# Patient Record
Sex: FEMALE | Race: BLACK OR AFRICAN AMERICAN | NOT HISPANIC OR LATINO | Employment: FULL TIME | URBAN - METROPOLITAN AREA
[De-identification: names, ages, dates, MRNs, and addresses within clinical notes are randomized per-mention and may not be internally consistent; named-entity substitution may affect disease eponyms.]

---

## 2017-08-28 ENCOUNTER — PATIENT MESSAGE (OUTPATIENT)
Dept: OBSTETRICS AND GYNECOLOGY | Facility: CLINIC | Age: 37
End: 2017-08-28

## 2017-12-18 ENCOUNTER — OFFICE VISIT (OUTPATIENT)
Dept: OBSTETRICS AND GYNECOLOGY | Facility: CLINIC | Age: 37
End: 2017-12-18
Payer: COMMERCIAL

## 2017-12-18 VITALS
BODY MASS INDEX: 51.91 KG/M2 | HEIGHT: 63 IN | DIASTOLIC BLOOD PRESSURE: 83 MMHG | WEIGHT: 293 LBS | SYSTOLIC BLOOD PRESSURE: 126 MMHG

## 2017-12-18 DIAGNOSIS — Z12.4 SCREENING FOR CERVICAL CANCER: ICD-10-CM

## 2017-12-18 DIAGNOSIS — Z01.419 ENCOUNTER FOR GYNECOLOGICAL EXAMINATION (GENERAL) (ROUTINE) WITHOUT ABNORMAL FINDINGS: Primary | ICD-10-CM

## 2017-12-18 PROCEDURE — 88175 CYTOPATH C/V AUTO FLUID REDO: CPT

## 2017-12-18 PROCEDURE — 99999 PR PBB SHADOW E&M-EST. PATIENT-LVL III: CPT | Mod: PBBFAC,,, | Performed by: OBSTETRICS & GYNECOLOGY

## 2017-12-18 PROCEDURE — 99395 PREV VISIT EST AGE 18-39: CPT | Mod: S$GLB,,, | Performed by: OBSTETRICS & GYNECOLOGY

## 2017-12-18 NOTE — PROGRESS NOTES
Subjective:       Patient ID: Favian Hernandes is a 37 y.o. female.    Chief Complaint:  Annual Exam      History of Present Illness  HPI  Annual Exam-Premenopausal  Patient presents for annual exam. The patient has no complaints today. The patient is sexually active--trying to conceive. GYN screening history: last pap: approximate date  and was normal. The patient wears seatbelts: yes. The patient participates in regular exercise: yes--walking 3 times/wk; and at work; . Has the patient ever been transfused or tattooed?: no. The patient reports that there is not domestic violence in her life.    Menses monthly q 24 days, flow 3-5 d; super tampon on first 2 days; change q3-4 hrs; min to mod dysmenorrhea--relief with aspirin/ibuprofen    Reports occas urge /overflow incontinence        GYN & OB History  Patient's last menstrual period was 2017.   Date of Last Pap: No result found    OB History    Para Term  AB Living   2 1   1 1 0   SAB TAB Ectopic Multiple Live Births   1       0      # Outcome Date GA Lbr Gus/2nd Weight Sex Delivery Anes PTL Lv   2 SAB            1       Vag-Spont             Review of Systems  Review of Systems   Constitutional: Negative for activity change, appetite change, chills, diaphoresis, fatigue, fever and unexpected weight change.   HENT: Negative for mouth sores and tinnitus.    Eyes: Negative for discharge and visual disturbance.   Respiratory: Negative for cough, shortness of breath and wheezing.    Cardiovascular: Negative for chest pain, palpitations and leg swelling.   Gastrointestinal: Negative for abdominal pain, bloating, blood in stool, constipation, diarrhea, nausea and vomiting.   Endocrine: Negative for diabetes, hair loss, hot flashes, hyperthyroidism and hypothyroidism.   Genitourinary: Negative for decreased libido, dyspareunia, dysuria, flank pain, frequency, genital sores, hematuria, menorrhagia, menstrual problem, pelvic pain,  urgency, vaginal bleeding, vaginal discharge, vaginal pain, dysmenorrhea, urinary incontinence, postcoital bleeding, postmenopausal bleeding and vaginal odor.   Musculoskeletal: Negative for back pain and myalgias.   Skin:  Negative for rash, no acne and hair changes.   Neurological: Negative for seizures, syncope, numbness and headaches.   Hematological: Negative for adenopathy. Does not bruise/bleed easily.   Psychiatric/Behavioral: Negative for depression and sleep disturbance. The patient is not nervous/anxious.    Breast: Negative for breast mass, breast pain, nipple discharge and skin changes          Objective:    Physical Exam:   Constitutional: She appears well-developed.     Eyes: Conjunctivae and EOM are normal. Pupils are equal, round, and reactive to light.    Neck: Normal range of motion. Neck supple.     Pulmonary/Chest: Effort normal. Right breast exhibits no mass, no nipple discharge, no skin change and no tenderness. Left breast exhibits no mass, no nipple discharge, no skin change and no tenderness. Breasts are symmetrical.        Abdominal: Soft.     Genitourinary: Rectum normal, vagina normal and uterus normal. Pelvic exam was performed with patient supine. Cervix is normal. Right adnexum displays no mass and no tenderness. Left adnexum displays no mass and no tenderness. No erythema, bleeding, rectocele, cystocele or unspecified prolapse of vaginal walls in the vagina. No vaginal discharge found. Labial bartholins normal.       Uterus Size: 6 cm   Musculoskeletal: Normal range of motion.       Neurological: She is alert.    Skin: Skin is warm.    Psychiatric: She has a normal mood and affect.          Assessment:     Encounter Diagnoses   Name Primary?    Encounter for gynecological examination (general) (routine) without abnormal findings Yes    Screening for cervical cancer                Plan:      Continue annual well woman exam.  Pap today  Continue prenatal vitamin and menstrual  calendar  Continue diet, exercise, weight loss

## 2017-12-27 ENCOUNTER — PATIENT MESSAGE (OUTPATIENT)
Dept: OBSTETRICS AND GYNECOLOGY | Facility: CLINIC | Age: 37
End: 2017-12-27

## 2018-08-08 ENCOUNTER — LAB VISIT (OUTPATIENT)
Dept: LAB | Facility: HOSPITAL | Age: 38
End: 2018-08-08
Attending: ADVANCED PRACTICE MIDWIFE
Payer: COMMERCIAL

## 2018-08-08 ENCOUNTER — INITIAL PRENATAL (OUTPATIENT)
Dept: OBSTETRICS AND GYNECOLOGY | Facility: CLINIC | Age: 38
End: 2018-08-08
Payer: COMMERCIAL

## 2018-08-08 VITALS — DIASTOLIC BLOOD PRESSURE: 86 MMHG | SYSTOLIC BLOOD PRESSURE: 120 MMHG | BODY MASS INDEX: 59.36 KG/M2 | WEIGHT: 293 LBS

## 2018-08-08 DIAGNOSIS — O09.91 HIGH-RISK PREGNANCY IN FIRST TRIMESTER: ICD-10-CM

## 2018-08-08 DIAGNOSIS — O09.299 H/O INCOMPETENT CERVIX, CURRENTLY PREGNANT: ICD-10-CM

## 2018-08-08 DIAGNOSIS — O09.91 HIGH-RISK PREGNANCY IN FIRST TRIMESTER: Primary | ICD-10-CM

## 2018-08-08 LAB
ABO + RH BLD: NORMAL
ALBUMIN SERPL BCP-MCNC: 3.6 G/DL
ALP SERPL-CCNC: 53 U/L
ALT SERPL W/O P-5'-P-CCNC: 31 U/L
ANION GAP SERPL CALC-SCNC: 10 MMOL/L
AST SERPL-CCNC: 30 U/L
BASOPHILS # BLD AUTO: 0.05 K/UL
BASOPHILS NFR BLD: 1 %
BILIRUB SERPL-MCNC: 0.3 MG/DL
BLD GP AB SCN CELLS X3 SERPL QL: NORMAL
BUN SERPL-MCNC: 6 MG/DL
CALCIUM SERPL-MCNC: 9.7 MG/DL
CHLORIDE SERPL-SCNC: 103 MMOL/L
CO2 SERPL-SCNC: 21 MMOL/L
CREAT SERPL-MCNC: 0.8 MG/DL
DIFFERENTIAL METHOD: ABNORMAL
EOSINOPHIL # BLD AUTO: 0 K/UL
EOSINOPHIL NFR BLD: 0.8 %
ERYTHROCYTE [DISTWIDTH] IN BLOOD BY AUTOMATED COUNT: 14.1 %
EST. GFR  (AFRICAN AMERICAN): >60 ML/MIN/1.73 M^2
EST. GFR  (NON AFRICAN AMERICAN): >60 ML/MIN/1.73 M^2
GLUCOSE SERPL-MCNC: 88 MG/DL
HCT VFR BLD AUTO: 35.8 %
HGB BLD-MCNC: 11.8 G/DL
IMM GRANULOCYTES # BLD AUTO: 0.01 K/UL
IMM GRANULOCYTES NFR BLD AUTO: 0.2 %
LYMPHOCYTES # BLD AUTO: 1.7 K/UL
LYMPHOCYTES NFR BLD: 35.4 %
MCH RBC QN AUTO: 28.3 PG
MCHC RBC AUTO-ENTMCNC: 33 G/DL
MCV RBC AUTO: 86 FL
MONOCYTES # BLD AUTO: 0.6 K/UL
MONOCYTES NFR BLD: 12.2 %
NEUTROPHILS # BLD AUTO: 2.4 K/UL
NEUTROPHILS NFR BLD: 50.4 %
NRBC BLD-RTO: 0 /100 WBC
PLATELET # BLD AUTO: 500 K/UL
PMV BLD AUTO: 9.6 FL
POTASSIUM SERPL-SCNC: 3.7 MMOL/L
PROT SERPL-MCNC: 7.8 G/DL
RBC # BLD AUTO: 4.17 M/UL
SODIUM SERPL-SCNC: 134 MMOL/L
WBC # BLD AUTO: 4.77 K/UL

## 2018-08-08 PROCEDURE — 99999 PR PBB SHADOW E&M-EST. PATIENT-LVL II: CPT | Mod: PBBFAC,,, | Performed by: ADVANCED PRACTICE MIDWIFE

## 2018-08-08 PROCEDURE — 36415 COLL VENOUS BLD VENIPUNCTURE: CPT | Mod: PO

## 2018-08-08 PROCEDURE — 85025 COMPLETE CBC W/AUTO DIFF WBC: CPT

## 2018-08-08 PROCEDURE — 80053 COMPREHEN METABOLIC PANEL: CPT

## 2018-08-08 PROCEDURE — 86703 HIV-1/HIV-2 1 RESULT ANTBDY: CPT

## 2018-08-08 PROCEDURE — 87340 HEPATITIS B SURFACE AG IA: CPT

## 2018-08-08 PROCEDURE — 86901 BLOOD TYPING SEROLOGIC RH(D): CPT

## 2018-08-08 PROCEDURE — 0500F INITIAL PRENATAL CARE VISIT: CPT | Mod: S$GLB,,, | Performed by: ADVANCED PRACTICE MIDWIFE

## 2018-08-08 PROCEDURE — 86592 SYPHILIS TEST NON-TREP QUAL: CPT

## 2018-08-08 PROCEDURE — 86762 RUBELLA ANTIBODY: CPT

## 2018-08-08 NOTE — PROGRESS NOTES
Coffective counseling sheet Learn Your Baby discussed with mother. Instructed regarding feeding cues and methods to calm baby. Encouraged mother to download Coffective mobile arina if she has not already done so.  Mother verbalized understanding.  NOB labs today US 1 week.  POC discussed with patient and Dr Sparks.  Pelvic rest advised.  VM

## 2018-08-09 LAB
HBV SURFACE AG SERPL QL IA: NEGATIVE
HIV 1+2 AB+HIV1 P24 AG SERPL QL IA: NEGATIVE
RPR SER QL: NORMAL
RUBV IGG SER-ACNC: 127 IU/ML
RUBV IGG SER-IMP: REACTIVE

## 2018-08-13 ENCOUNTER — TELEPHONE (OUTPATIENT)
Dept: OBSTETRICS AND GYNECOLOGY | Facility: CLINIC | Age: 38
End: 2018-08-13

## 2018-08-13 ENCOUNTER — ROUTINE PRENATAL (OUTPATIENT)
Dept: OBSTETRICS AND GYNECOLOGY | Facility: CLINIC | Age: 38
End: 2018-08-13
Payer: COMMERCIAL

## 2018-08-13 ENCOUNTER — PROCEDURE VISIT (OUTPATIENT)
Dept: OBSTETRICS AND GYNECOLOGY | Facility: CLINIC | Age: 38
End: 2018-08-13
Payer: COMMERCIAL

## 2018-08-13 VITALS — SYSTOLIC BLOOD PRESSURE: 118 MMHG | DIASTOLIC BLOOD PRESSURE: 72 MMHG | WEIGHT: 293 LBS | BODY MASS INDEX: 59.34 KG/M2

## 2018-08-13 DIAGNOSIS — O09.91 HIGH-RISK PREGNANCY IN FIRST TRIMESTER: ICD-10-CM

## 2018-08-13 DIAGNOSIS — N88.3 INCOMPETENCE OF CERVIX: Primary | ICD-10-CM

## 2018-08-13 DIAGNOSIS — D75.839 THROMBOCYTOSIS: Primary | ICD-10-CM

## 2018-08-13 PROCEDURE — 99999 PR PBB SHADOW E&M-EST. PATIENT-LVL II: CPT | Mod: PBBFAC,,, | Performed by: ADVANCED PRACTICE MIDWIFE

## 2018-08-13 PROCEDURE — 76801 OB US < 14 WKS SINGLE FETUS: CPT | Mod: S$GLB,,, | Performed by: OBSTETRICS & GYNECOLOGY

## 2018-08-13 PROCEDURE — 0502F SUBSEQUENT PRENATAL CARE: CPT | Mod: S$GLB,,, | Performed by: ADVANCED PRACTICE MIDWIFE

## 2018-08-13 NOTE — PROGRESS NOTES
US reviewed with patient .  Embryo nl. With nl. FHT's.  Increased Plt. Count.  Will repeat with RTC 4 weeks. Pt info given to Dr Sparks to schedule cerclage at 14 weeks.  Cervical length at 13 weeks prior to cerclage. VM

## 2018-09-10 ENCOUNTER — ROUTINE PRENATAL (OUTPATIENT)
Dept: OBSTETRICS AND GYNECOLOGY | Facility: CLINIC | Age: 38
End: 2018-09-10
Payer: COMMERCIAL

## 2018-09-10 VITALS — SYSTOLIC BLOOD PRESSURE: 120 MMHG | BODY MASS INDEX: 57.64 KG/M2 | DIASTOLIC BLOOD PRESSURE: 76 MMHG | WEIGHT: 293 LBS

## 2018-09-10 DIAGNOSIS — O09.299 H/O INCOMPETENT CERVIX, CURRENTLY PREGNANT: ICD-10-CM

## 2018-09-10 DIAGNOSIS — O09.91 HIGH-RISK PREGNANCY IN FIRST TRIMESTER: Primary | ICD-10-CM

## 2018-09-10 PROCEDURE — 99999 PR PBB SHADOW E&M-EST. PATIENT-LVL II: CPT | Mod: PBBFAC,,, | Performed by: ADVANCED PRACTICE MIDWIFE

## 2018-09-10 PROCEDURE — 0502F SUBSEQUENT PRENATAL CARE: CPT | Mod: S$GLB,,, | Performed by: ADVANCED PRACTICE MIDWIFE

## 2018-09-10 RX ORDER — HYDROXYPROGESTERONE CAPROATE 250 MG/ML
250 INJECTION INTRAMUSCULAR
Qty: 1000 MG | Refills: 11 | Status: SHIPPED | OUTPATIENT
Start: 2018-09-10 | End: 2018-10-31 | Stop reason: ALTCHOICE

## 2018-09-10 NOTE — PROGRESS NOTES
Weight loss noted.  Patient states has not had appetite but states has come back over the weekend.  Will monitor.  Has pre-op appt scheduled for 9/18 and cerclage scheduled for 9/26.  Pt denies any complaints.  RTC 4 weeks or sooner if indicated.  US sched for 9/18 visit.

## 2018-09-11 ENCOUNTER — TELEPHONE (OUTPATIENT)
Dept: PHARMACY | Facility: CLINIC | Age: 38
End: 2018-09-11

## 2018-09-18 ENCOUNTER — ROUTINE PRENATAL (OUTPATIENT)
Dept: OBSTETRICS AND GYNECOLOGY | Facility: CLINIC | Age: 38
End: 2018-09-18
Payer: COMMERCIAL

## 2018-09-18 ENCOUNTER — PROCEDURE VISIT (OUTPATIENT)
Dept: OBSTETRICS AND GYNECOLOGY | Facility: CLINIC | Age: 38
End: 2018-09-18
Payer: COMMERCIAL

## 2018-09-18 ENCOUNTER — HOSPITAL ENCOUNTER (OUTPATIENT)
Dept: PREADMISSION TESTING | Facility: HOSPITAL | Age: 38
Discharge: HOME | End: 2018-09-18
Attending: OBSTETRICS & GYNECOLOGY
Payer: COMMERCIAL

## 2018-09-18 VITALS — HEIGHT: 63 IN | WEIGHT: 293 LBS | BODY MASS INDEX: 51.91 KG/M2

## 2018-09-18 VITALS — DIASTOLIC BLOOD PRESSURE: 84 MMHG | WEIGHT: 293 LBS | BODY MASS INDEX: 57.8 KG/M2 | SYSTOLIC BLOOD PRESSURE: 114 MMHG

## 2018-09-18 DIAGNOSIS — O09.291 SUPERVISION OF PREGNANCY WITH OTHER POOR REPRODUCTIVE OR OBSTETRIC HISTORY, FIRST TRIMESTER: Primary | ICD-10-CM

## 2018-09-18 DIAGNOSIS — N76.0 BACTERIAL VAGINITIS: ICD-10-CM

## 2018-09-18 DIAGNOSIS — B96.89 BACTERIAL VAGINITIS: ICD-10-CM

## 2018-09-18 DIAGNOSIS — O09.299 H/O INCOMPETENT CERVIX, CURRENTLY PREGNANT: Primary | ICD-10-CM

## 2018-09-18 PROCEDURE — 99999 PR PBB SHADOW E&M-EST. PATIENT-LVL II: CPT | Mod: PBBFAC,,, | Performed by: OBSTETRICS & GYNECOLOGY

## 2018-09-18 PROCEDURE — 0502F SUBSEQUENT PRENATAL CARE: CPT | Mod: S$GLB,,, | Performed by: OBSTETRICS & GYNECOLOGY

## 2018-09-18 PROCEDURE — 76817 TRANSVAGINAL US OBSTETRIC: CPT | Mod: S$GLB,,, | Performed by: OBSTETRICS & GYNECOLOGY

## 2018-09-18 RX ORDER — CLINDAMYCIN HYDROCHLORIDE 300 MG/1
300 CAPSULE ORAL 2 TIMES DAILY
Qty: 14 CAPSULE | Refills: 0 | Status: ON HOLD | OUTPATIENT
Start: 2018-09-18 | End: 2018-09-26 | Stop reason: ALTCHOICE

## 2018-09-18 NOTE — DISCHARGE INSTRUCTIONS
To confirm, Your doctor has instructed you that surgery is scheduled for 9/26/18  at  07:00 am.       Please report to [facility], [address], 1st floor, main lobby by 05:30 am.    Pre admit office will call afternoon prior to surgery with final arrival time    INSTRUCTIONS IMPORTANT!!!   Do not eat, drink, or smoke after 12 midnight-including water. OK to brush teeth, no gum, candy or mints!    ¨ Take only these medicines with a small swallow of water-morning of surgery.  none    Pre operative instructions:  Please review the Pre-Operative Instruction booklet that you were given.        Bathing Instructions--See page 6 in the Pre-operative booklet.      Prevention of surgical site infections:     -Keep incisions clean and dry.   -Do not soak/submerge incisions in water until completely healed.   -Do not apply lotions, powders, creams, or deodorants to site.   -Always make sure hands are cleaned with antibacterial soap/ alcohol-based                 prior to touching the surgical site.  (This includes doctors,                 nurses, staff, and yourself.)    Signs and symptoms:   -Redness and pain around the area where you had surgery   -Drainage of cloudy fluid from your surgical wound   -Fever over 100.4       I have read or had read and explained to me, and understand the above information.  Additional comments or instructions:  Received a copy of Pre-operative instructions booklet, FAQ surgical site infection sheet, and packets of hibiclens (if indicated).

## 2018-09-18 NOTE — PROGRESS NOTES
History & Physical    SUBJECTIVE:     History of Present Illness:  Patient is a 38 y.o. female presents with poor obstetrical history;.  Currently pregnant; hx of 25 wk loss with  contractions and rupture and bleeding;  Currently 12w5d, cervical [id number] with debris in cervical canal;  Currently denies vaginal bleeding or abnormal discharge.      Chief Complaint   Patient presents with    preop        Review of patient's allergies indicates:  No Known Allergies    Current Outpatient Medications   Medication Sig Dispense Refill    prenatal 25/iron fum/folic/dha (PRENATAL-1 ORAL) Take 2 tablets by mouth once daily. 2 gummies/day      clindamycin (CLEOCIN) 300 MG capsule Take 1 capsule (300 mg total) by mouth 2 (two) times daily. for 7 days 14 capsule 0    HYDROXYprogesterone caproate (SALLY) injection Inject 250 mg into the muscle every 7 days. 1000 mg 11     No current facility-administered medications for this visit.        History reviewed. No pertinent past medical history.  History reviewed. No pertinent surgical history.  Family History   Problem Relation Age of Onset    Diabetes Father     Hypertension Mother      Social History     Tobacco Use    Smoking status: Never Smoker    Smokeless tobacco: Never Used   Substance Use Topics    Alcohol use: No     Comment: socally     Drug use: No        Review of Systems:  Review of Systems   Constitutional: Negative.    HENT: Negative.    Eyes: Negative.    Cardiovascular: Negative.    Gastrointestinal: Negative.    Endocrine: Negative.    Genitourinary: Negative.    Musculoskeletal: Negative.    Skin: Negative.    Allergic/Immunologic: Negative.    Neurological: Negative.    Hematological: Negative.    Psychiatric/Behavioral: Negative.  Negative for agitation.   All other systems reviewed and are negative.      OBJECTIVE:     Vital Signs (Most Recent)  BP: 114/84 (18 1327)     (!) 148 kg (326 lb 4.5 oz)     Physical Exam:  Physical Exam    Constitutional: She is oriented to person, place, and time. She appears well-developed.   HENT:   Head: Normocephalic.   Eyes: Conjunctivae and EOM are normal. Pupils are equal, round, and reactive to light.   Neck: Normal range of motion.   Cardiovascular: Normal rate.   Pulmonary/Chest: Effort normal and breath sounds normal.   Abdominal: Soft.   Genitourinary:   Genitourinary Comments: deferred   Musculoskeletal: Normal range of motion.   Neurological: She is alert and oriented to person, place, and time.   Skin: Skin is warm and dry.   Psychiatric: She has a normal mood and affect. Her behavior is normal. Judgment and thought content normal.   Vitals reviewed.      Laboratory  cbc, cmp to be drawn    Diagnostic Results:  US: Reviewed  12w5d  Cervical length 29.5 mm; fluid in cervical canal; closed at internal os;   +fht  ASSESSMENT/PLAN:     Encounter Diagnoses   Name Primary?    Bacterial vaginitis     Supervision of pregnancy with other poor reproductive or obstetric history, first trimester Yes         PLAN:Plan     Reviewed cervical cerclage procedure in detail.  Reviewed risks including but not limited to infection, bleeding, damage to bowel/bladder, cva;htn, death, risk of ruptured membranes.    All questions answered to the best of my ability.  Alternatives reviewed --continued observation, use of see;   Pt wishes to proceed with cerclage placement.  Consents signed witnessed.  Alternatives reviewed; post op course reviewed (motrin x 24 hr post op; z pack)

## 2018-09-18 NOTE — H&P (VIEW-ONLY)
History & Physical    SUBJECTIVE:     History of Present Illness:  Patient is a 38 y.o. female presents with poor obstetrical history;.  Currently pregnant; hx of 25 wk loss with  contractions and rupture and bleeding;  Currently 12w5d, cervical aictuz14.5 with debris in cervical canal;  Currently denies vaginal bleeding or abnormal discharge.      Chief Complaint   Patient presents with    preop        Review of patient's allergies indicates:  No Known Allergies    Current Outpatient Medications   Medication Sig Dispense Refill    prenatal 25/iron fum/folic/dha (PRENATAL-1 ORAL) Take 2 tablets by mouth once daily. 2 gummies/day      clindamycin (CLEOCIN) 300 MG capsule Take 1 capsule (300 mg total) by mouth 2 (two) times daily. for 7 days 14 capsule 0    HYDROXYprogesterone caproate (SALLY) injection Inject 250 mg into the muscle every 7 days. 1000 mg 11     No current facility-administered medications for this visit.        History reviewed. No pertinent past medical history.  History reviewed. No pertinent surgical history.  Family History   Problem Relation Age of Onset    Diabetes Father     Hypertension Mother      Social History     Tobacco Use    Smoking status: Never Smoker    Smokeless tobacco: Never Used   Substance Use Topics    Alcohol use: No     Comment: socally     Drug use: No        Review of Systems:  Review of Systems   Constitutional: Negative.    HENT: Negative.    Eyes: Negative.    Cardiovascular: Negative.    Gastrointestinal: Negative.    Endocrine: Negative.    Genitourinary: Negative.    Musculoskeletal: Negative.    Skin: Negative.    Allergic/Immunologic: Negative.    Neurological: Negative.    Hematological: Negative.    Psychiatric/Behavioral: Negative.  Negative for agitation.   All other systems reviewed and are negative.      OBJECTIVE:     Vital Signs (Most Recent)  BP: 114/84 (18 1327)     (!) 148 kg (326 lb 4.5 oz)     Physical Exam:  Physical Exam    Constitutional: She is oriented to person, place, and time. She appears well-developed.   HENT:   Head: Normocephalic.   Eyes: Conjunctivae and EOM are normal. Pupils are equal, round, and reactive to light.   Neck: Normal range of motion.   Cardiovascular: Normal rate.   Pulmonary/Chest: Effort normal and breath sounds normal.   Abdominal: Soft.   Genitourinary:   Genitourinary Comments: deferred   Musculoskeletal: Normal range of motion.   Neurological: She is alert and oriented to person, place, and time.   Skin: Skin is warm and dry.   Psychiatric: She has a normal mood and affect. Her behavior is normal. Judgment and thought content normal.   Vitals reviewed.      Laboratory  cbc, cmp to be drawn    Diagnostic Results:  US: Reviewed  12w5d  Cervical length 29.5 mm; fluid in cervical canal; closed at internal os;   +fht  ASSESSMENT/PLAN:     Encounter Diagnoses   Name Primary?    Bacterial vaginitis     Supervision of pregnancy with other poor reproductive or obstetric history, first trimester Yes         PLAN:Plan     Reviewed cervical cerclage procedure in detail.  Reviewed risks including but not limited to infection, bleeding, damage to bowel/bladder, cva;htn, death, risk of ruptured membranes.    All questions answered to the best of my ability.  Alternatives reviewed --continued observation, use of see;   Pt wishes to proceed with cerclage placement.  Consents signed witnessed.  Alternatives reviewed; post op course reviewed (motrin x 24 hr post op; z pack)

## 2018-09-26 ENCOUNTER — ANESTHESIA EVENT (OUTPATIENT)
Dept: SURGERY | Facility: HOSPITAL | Age: 38
End: 2018-09-26
Payer: COMMERCIAL

## 2018-09-26 ENCOUNTER — ANESTHESIA (OUTPATIENT)
Dept: SURGERY | Facility: HOSPITAL | Age: 38
End: 2018-09-26
Payer: COMMERCIAL

## 2018-09-26 ENCOUNTER — HOSPITAL ENCOUNTER (OUTPATIENT)
Facility: HOSPITAL | Age: 38
Discharge: HOME | End: 2018-09-26
Attending: OBSTETRICS & GYNECOLOGY | Admitting: OBSTETRICS & GYNECOLOGY
Payer: COMMERCIAL

## 2018-09-26 DIAGNOSIS — O34.32 INCOMPETENT CERVIX DURING SECOND TRIMESTER, ANTEPARTUM: ICD-10-CM

## 2018-09-26 DIAGNOSIS — O09.91 HIGH-RISK PREGNANCY IN FIRST TRIMESTER: Primary | ICD-10-CM

## 2018-09-26 PROCEDURE — 37000008 HC ANESTHESIA 1ST 15 MINUTES: Performed by: OBSTETRICS & GYNECOLOGY

## 2018-09-26 PROCEDURE — 36000707: Performed by: OBSTETRICS & GYNECOLOGY

## 2018-09-26 PROCEDURE — 71000033 HC RECOVERY, INTIAL HOUR: Performed by: OBSTETRICS & GYNECOLOGY

## 2018-09-26 PROCEDURE — 71000015 HC POSTOP RECOV 1ST HR: Performed by: OBSTETRICS & GYNECOLOGY

## 2018-09-26 PROCEDURE — 63600175 PHARM REV CODE 636 W HCPCS: Performed by: NURSE ANESTHETIST, CERTIFIED REGISTERED

## 2018-09-26 PROCEDURE — 36000706: Performed by: OBSTETRICS & GYNECOLOGY

## 2018-09-26 PROCEDURE — 59320 REVISION OF CERVIX: CPT | Mod: ,,, | Performed by: OBSTETRICS & GYNECOLOGY

## 2018-09-26 PROCEDURE — 63600175 PHARM REV CODE 636 W HCPCS: Performed by: OBSTETRICS & GYNECOLOGY

## 2018-09-26 PROCEDURE — 25000003 PHARM REV CODE 250: Performed by: NURSE ANESTHETIST, CERTIFIED REGISTERED

## 2018-09-26 PROCEDURE — 37000009 HC ANESTHESIA EA ADD 15 MINS: Performed by: OBSTETRICS & GYNECOLOGY

## 2018-09-26 RX ORDER — SODIUM CHLORIDE, SODIUM LACTATE, POTASSIUM CHLORIDE, CALCIUM CHLORIDE 600; 310; 30; 20 MG/100ML; MG/100ML; MG/100ML; MG/100ML
INJECTION, SOLUTION INTRAVENOUS CONTINUOUS PRN
Status: DISCONTINUED | OUTPATIENT
Start: 2018-09-26 | End: 2018-09-26

## 2018-09-26 RX ORDER — DIPHENHYDRAMINE HYDROCHLORIDE 50 MG/ML
25 INJECTION INTRAMUSCULAR; INTRAVENOUS EVERY 4 HOURS PRN
Status: DISCONTINUED | OUTPATIENT
Start: 2018-09-26 | End: 2018-09-26 | Stop reason: HOSPADM

## 2018-09-26 RX ORDER — AZITHROMYCIN 250 MG/1
TABLET, FILM COATED ORAL
Qty: 6 TABLET | Refills: 0 | Status: SHIPPED | OUTPATIENT
Start: 2018-09-26 | End: 2018-10-05

## 2018-09-26 RX ORDER — CEFAZOLIN SODIUM 1 G/50ML
2 SOLUTION INTRAVENOUS
Status: COMPLETED | OUTPATIENT
Start: 2018-09-26 | End: 2018-09-26

## 2018-09-26 RX ORDER — DIPHENHYDRAMINE HCL 25 MG
25 CAPSULE ORAL EVERY 4 HOURS PRN
Status: DISCONTINUED | OUTPATIENT
Start: 2018-09-26 | End: 2018-09-26 | Stop reason: HOSPADM

## 2018-09-26 RX ORDER — ONDANSETRON 8 MG/1
8 TABLET, ORALLY DISINTEGRATING ORAL EVERY 8 HOURS PRN
Status: DISCONTINUED | OUTPATIENT
Start: 2018-09-26 | End: 2018-09-26 | Stop reason: HOSPADM

## 2018-09-26 RX ORDER — METOCLOPRAMIDE HYDROCHLORIDE 5 MG/ML
10 INJECTION INTRAMUSCULAR; INTRAVENOUS EVERY 10 MIN PRN
Status: DISCONTINUED | OUTPATIENT
Start: 2018-09-26 | End: 2018-09-26 | Stop reason: HOSPADM

## 2018-09-26 RX ORDER — FENTANYL CITRATE 50 UG/ML
INJECTION, SOLUTION INTRAMUSCULAR; INTRAVENOUS
Status: DISCONTINUED | OUTPATIENT
Start: 2018-09-26 | End: 2018-09-26

## 2018-09-26 RX ORDER — OXYCODONE HYDROCHLORIDE 5 MG/1
5 TABLET ORAL
Status: DISCONTINUED | OUTPATIENT
Start: 2018-09-26 | End: 2018-09-26 | Stop reason: HOSPADM

## 2018-09-26 RX ORDER — PROPOFOL 10 MG/ML
VIAL (ML) INTRAVENOUS
Status: DISCONTINUED | OUTPATIENT
Start: 2018-09-26 | End: 2018-09-26

## 2018-09-26 RX ORDER — MORPHINE SULFATE 4 MG/ML
2 INJECTION, SOLUTION INTRAMUSCULAR; INTRAVENOUS EVERY 5 MIN PRN
Status: DISCONTINUED | OUTPATIENT
Start: 2018-09-26 | End: 2018-09-26 | Stop reason: HOSPADM

## 2018-09-26 RX ORDER — SODIUM CHLORIDE 0.9 % (FLUSH) 0.9 %
3 SYRINGE (ML) INJECTION
Status: DISCONTINUED | OUTPATIENT
Start: 2018-09-26 | End: 2018-09-26 | Stop reason: HOSPADM

## 2018-09-26 RX ORDER — ROCURONIUM BROMIDE 10 MG/ML
INJECTION, SOLUTION INTRAVENOUS
Status: DISCONTINUED | OUTPATIENT
Start: 2018-09-26 | End: 2018-09-26

## 2018-09-26 RX ORDER — LIDOCAINE HYDROCHLORIDE 10 MG/ML
INJECTION INFILTRATION; PERINEURAL
Status: DISCONTINUED | OUTPATIENT
Start: 2018-09-26 | End: 2018-09-26

## 2018-09-26 RX ORDER — SODIUM CHLORIDE 0.9 % (FLUSH) 0.9 %
3 SYRINGE (ML) INJECTION EVERY 8 HOURS
Status: DISCONTINUED | OUTPATIENT
Start: 2018-09-26 | End: 2018-09-26 | Stop reason: HOSPADM

## 2018-09-26 RX ORDER — MEPERIDINE HYDROCHLORIDE 50 MG/ML
12.5 INJECTION INTRAMUSCULAR; INTRAVENOUS; SUBCUTANEOUS ONCE AS NEEDED
Status: DISCONTINUED | OUTPATIENT
Start: 2018-09-26 | End: 2018-09-26 | Stop reason: HOSPADM

## 2018-09-26 RX ORDER — IBUPROFEN 800 MG/1
800 TABLET ORAL 3 TIMES DAILY
Qty: 3 TABLET | Refills: 0 | Status: SHIPPED | OUTPATIENT
Start: 2018-09-26 | End: 2018-09-27

## 2018-09-26 RX ORDER — ONDANSETRON 2 MG/ML
INJECTION INTRAMUSCULAR; INTRAVENOUS
Status: DISCONTINUED | OUTPATIENT
Start: 2018-09-26 | End: 2018-09-26

## 2018-09-26 RX ORDER — IBUPROFEN 600 MG/1
600 TABLET ORAL EVERY 6 HOURS PRN
Status: DISCONTINUED | OUTPATIENT
Start: 2018-09-26 | End: 2018-09-26 | Stop reason: HOSPADM

## 2018-09-26 RX ADMIN — CEFAZOLIN SODIUM 3 G: 1 SOLUTION INTRAVENOUS at 07:09

## 2018-09-26 RX ADMIN — ONDANSETRON 4 MG: 2 INJECTION, SOLUTION INTRAMUSCULAR; INTRAVENOUS at 07:09

## 2018-09-26 RX ADMIN — LIDOCAINE HYDROCHLORIDE 50 MG: 10 INJECTION, SOLUTION INFILTRATION; PERINEURAL at 07:09

## 2018-09-26 RX ADMIN — PROPOFOL 200 MG: 10 INJECTION, EMULSION INTRAVENOUS at 07:09

## 2018-09-26 RX ADMIN — ROCURONIUM BROMIDE 20 MG: 10 INJECTION, SOLUTION INTRAVENOUS at 07:09

## 2018-09-26 RX ADMIN — SODIUM CHLORIDE, SODIUM LACTATE, POTASSIUM CHLORIDE, AND CALCIUM CHLORIDE: 600; 310; 30; 20 INJECTION, SOLUTION INTRAVENOUS at 06:09

## 2018-09-26 RX ADMIN — FENTANYL CITRATE 100 MCG: 50 INJECTION, SOLUTION INTRAMUSCULAR; INTRAVENOUS at 06:09

## 2018-09-26 NOTE — OP NOTE
[facility] -   General Surgery  Operative Note    SUMMARY     Date of Procedure: 9/26/2018     Procedure: Procedure(s) (LRB):  CERCLAGE, CERVIX (N/A)       Surgeon(s) and Role:     * Benita Sparks MD - Primary    Assisting Surgeon: Jonas Fernandez, MS3  Pre-Operative Diagnosis: Incompetence of cervix [N88.3]    Post-Operative Diagnosis: Post-Op Diagnosis Codes:     * Incompetence of cervix [N88.3]    Anesthesia: General    Technical Procedures Used: Flores cervical cerclage  The patient was taken to the operating room where general anesthesia was placed and found to be adequate.  She was placed in the dorsal supine position with her legs in the Luis stirrups.  Her perineum was then prepped and draped in the normal sterile fashion, and her bladder was drained in an in and out fashion.  Time out was performed.    A weighted sterile speculum was placed in the vagina and the findings stated above were noted.  The anterior lip of the cervix was grasped with a Ring Forcep.  The bladder was retracted back with a right angle retractor.  A Flores cerclage was then performed using mersilene tape.  The knot was tied at 12 o'clock.  Examination of the cervix afterwards revealed a closed cervix.  Excellent hemostasis was noted.  All instruments were removed from the vagina.    The patient tolerated the procedure well.  Sponge, laparotomy towels, and needle counts were correct x 2.  She will go to PACU in stable condition.    Description of the Findings of the Procedure: 2 cm long cervix, thick, closed to digital exam    Significant Surgical Tasks Conducted by the Assistant(s), if Applicable: retraction    Complications: No    Estimated Blood Loss (EBL): * No values recorded between 9/26/2018  7:11 AM and 9/26/2018  7:38 AM *           Implants: * No implants in log *    Specimens:   Specimen (12h ago, onward)    None                  Condition: Good    Disposition: PACU - hemodynamically stable.    Attestation:  I performed the procedure.

## 2018-09-26 NOTE — DISCHARGE INSTRUCTIONS
General Information:    1. Do not drink alcoholic beverages including beer for 24 hours or as long as you are on pain medication..  2. Do not drive a motor vehicle, operate machinery or power tools, or signs legal papers for 24 hours or as long as you are on pain medication.   3. You may experience light-headedness, dizziness, and sleepiness following surgery. Please do not stay alone. A responsible adult should be with you for this 24 hour period.  4. Go home and rest.  5. Progress slowly to a normal diet unless instructed.  Otherwise, begin with liquids such as soft drinks, then soup and crackers working up to solid foods. Drink plenty of nonalcoholic fluids.  6. Certain anesthetics and pain medications produce nausea and vomiting in certain individuals. If nausea becomes a problem at home, call you doctor.  7. A nurse will be calling you sometime after surgery. Do not be alarmed. This is our way of finding out how you are doing.  8. Several times every hour while you are awake, take 2-3 deep breaths and cough. If you had stomach surgery hold a pillow or rolled towel firmly against your stomach before you cough. This will help with any pain the cough might cause.  9. Several times every hour while you are awake, pump and flex your feet 5-6 times and do foot circles. This will help prevent blood clots.  10. Call your doctor for severe pain, bleeding, fever, or signs or symptoms of infection (pain, swelling, redness, foul odor, drainage).  11. You can contact your doctor anytime by [phone] for the [facility] (at [facility]) or [phone] for the [facility] on [facility].   [email or web address] is another way to contact your doctor if you are an active participant online with [facility].  12. Continue Nozin provided at discharge twice daily for 7 days or until the incision is healed.  See pamphlet or box for instructions.

## 2018-09-26 NOTE — TRANSFER OF CARE
"Anesthesia Transfer of Care Note    Patient: Favian Hernandes    Procedure(s) Performed: Procedure(s) (LRB):  CERCLAGE, CERVIX (N/A)    Patient location: PACU    Anesthesia Type: general    Transport from OR: Transported from OR on room air with adequate spontaneous ventilation    Post pain: adequate analgesia    Post assessment: no apparent anesthetic complications    Post vital signs: stable    Level of consciousness: awake    Nausea/Vomiting: no nausea/vomiting    Complications: none    Transfer of care protocol was followed      Last vitals:   Visit Vitals  BP (!) 146/91 (BP Location: Right arm, Patient Position: Sitting)   Pulse 103   Temp 36.5 °C (97.7 °F) (Temporal)   Resp 20   Ht 5' 3" (1.6 m)   Wt (!) 145.6 kg (320 lb 15.8 oz)   LMP 06/21/2018 (Exact Date)   SpO2 97%   Breastfeeding? No   BMI 56.86 kg/m²     "

## 2018-09-26 NOTE — BRIEF OP NOTE
[facility]  Brief Operative Note     SUMMARY     Surgery Date: 9/26/2018     Surgeon(s) and Role:     * Benita Sparks MD - Primary    Assisting Surgeon: Jonas Fernandez MS 3    Pre-op Diagnosis:  Incompetence of cervix [N88.3]    Post-op Diagnosis:  Post-Op Diagnosis Codes:     * Incompetence of cervix [N88.3]    Procedure(s) (LRB):  CERCLAGE, CERVIX (N/A)    Anesthesia: General    Description of the findings of the procedure: see operative note for details    Findings/Key Components: cervix 2 cm long, thick, closed    Estimated Blood Loss: * No values recorded between 9/26/2018  7:11 AM and 9/26/2018  7:40 AM *         Specimens:   Specimen (12h ago, onward)    None          Discharge Note    SUMMARY     Admit Date: 9/26/2018    Discharge Date and Time:  09/26/2018 7:42 AM    Hospital Course (synopsis of major diagnoses, care, treatment, and services provided during the course of the hospital stay): Pt underwent placement of cervical cerclage for history of incompetent cervix with pprom in previous pregnancy.  She tolerated the procedure well and was stable for discharge from the pacu.       Final Diagnosis: Post-Op Diagnosis Codes:     * Incompetence of cervix [N88.3]    Disposition: Home or Self Care    Follow Up/Patient Instructions:   Pelvic rest x 2 wks; keep scheduled post op appt  Medications:  Reconciled Home Medications:      Medication List      START taking these medications    azithromycin 250 MG tablet  Commonly known as:  Z-BILL  Take 2 tablets by mouth on day 1; Take 1 tablet by mouth on days 2-5     ibuprofen 800 MG tablet  Commonly known as:  ADVIL,MOTRIN  Take 1 tablet (800 mg total) by mouth 3 (three) times daily. for 1 day        CONTINUE taking these medications    HYDROXYprogesterone caproate injection  Commonly known as:  Sarah  Inject 250 mg into the muscle every 7 days.     PRENATAL-1 ORAL  Take 2 tablets by mouth once daily. 2 gummies/day          Discharge Procedure  Orders   Diet general     Other restrictions (specify):   Order Comments: Pelvic rest x 2 wks     Call MD for:  temperature >100.4     Call MD for:  persistent nausea and vomiting     Call MD for:  severe uncontrolled pain     Call MD for:   Order Comments: Vaginal bleeding >1 pad/hr     No dressing needed

## 2018-09-26 NOTE — ANESTHESIA PREPROCEDURE EVALUATION
09/26/2018  Favian Hernandes is a 38 y.o., female.    Anesthesia Evaluation      I have reviewed the Medications.     Review of Systems  Anesthesia Hx:  No previous Anesthesia Neg history of prior surgery.  Denies Personal Hx of Anesthesia complications.   Social:  Non-Smoker, No Alcohol Use    Hematology/Oncology:  Hematology Normal   Oncology Normal     EENT/Dental:EENT/Dental Normal   Cardiovascular:  Cardiovascular Normal     Pulmonary:  Pulmonary Normal    Renal/:  Renal/ Normal     Hepatic/GI:  Hepatic/GI Normal    Musculoskeletal:  Musculoskeletal Normal    Neurological:  Neurology Normal    Endocrine:  Endocrine Normal    Psych:  Psychiatric Normal           Physical Exam  General:  Well nourished, Obesity    Airway/Jaw/Neck:  Airway Findings: Mouth Opening: Normal Tongue: Normal  General Airway Assessment: Adult  Mallampati: I  TM Distance: Normal, at least 6 cm          Chest/Lungs:  Chest/Lungs Findings: Clear to auscultation, Normal Respiratory Rate     Heart/Vascular:  Heart Findings: Rate: Normal  Rhythm: Regular Rhythm  Sounds: Normal        Mental Status:  Mental Status Findings:  Cooperative, Alert and Oriented         Anesthesia Plan  Type of Anesthesia, risks & benefits discussed:  Anesthesia Type:  general  Patient's Preference:   Intra-op Monitoring Plan:   Intra-op Monitoring Plan Comments:   Post Op Pain Control Plan: multimodal analgesia  Post Op Pain Control Plan Comments:   Induction:   IV  Beta Blocker:  Patient is not currently on a Beta-Blocker (No further documentation required).       Informed Consent: Patient understands risks and agrees with Anesthesia plan.  Questions answered. Anesthesia consent signed with patient.  ASA Score: 2     Day of Surgery Review of History & Physical: I have interviewed and examined the patient. I have reviewed the patient's H&P dated:   There are no significant changes.          Ready For Surgery From Anesthesia Perspective.

## 2018-09-26 NOTE — INTERVAL H&P NOTE
The patient has been examined and the H&P has been reviewed:    I concur with the findings and no changes have occurred since H&P was written.  She is ready to proceed with cervical cerclage.  She has no further questions;  in preop holding    Anesthesia/Surgery risks, benefits and alternative options discussed and understood by patient/family.          Active Hospital Problems    Diagnosis  POA    Incompetent cervix during second trimester, antepartum [O34.32]  Yes      Resolved Hospital Problems   No resolved problems to display.

## 2018-09-26 NOTE — ANESTHESIA RELEASE NOTE
"Anesthesia Release from PACU Note    Patient: Favian Hernandes    Procedure(s) Performed: Procedure(s) (LRB):  CERCLAGE, CERVIX (N/A)    Anesthesia type: general    Post pain: Adequate analgesia    Post assessment: no apparent anesthetic complications    Last Vitals:   Visit Vitals  BP (!) 146/91 (BP Location: Right arm, Patient Position: Sitting)   Pulse 103   Temp 36.5 °C (97.7 °F) (Temporal)   Resp 20   Ht 5' 3" (1.6 m)   Wt (!) 145.6 kg (320 lb 15.8 oz)   LMP 06/21/2018 (Exact Date)   SpO2 97%   Breastfeeding? No   BMI 56.86 kg/m²       Post vital signs: stable    Level of consciousness: awake    Nausea/Vomiting: no nausea/no vomiting    Complications: none    Airway Patency: patent    Respiratory: unassisted    Cardiovascular: stable and blood pressure at baseline    Hydration: euvolemic  "

## 2018-09-27 NOTE — ANESTHESIA POSTPROCEDURE EVALUATION
"Anesthesia Post Evaluation    Patient: Favian Hernandes    Procedure(s) Performed: Procedure(s) (LRB):  CERCLAGE, CERVIX (N/A)    Final Anesthesia Type: general  Patient location during evaluation: PACU  Patient participation: Yes- Able to Participate  Level of consciousness: awake and alert and oriented  Post-procedure vital signs: reviewed and stable  Pain management: adequate  Airway patency: patent  PONV status at discharge: No PONV  Anesthetic complications: no      Cardiovascular status: hemodynamically stable  Respiratory status: unassisted, room air and spontaneous ventilation  Hydration status: euvolemic  Follow-up not needed.        Visit Vitals  /83   Pulse 96   Temp 36.7 °C (98.1 °F) (Oral)   Resp 16   Ht 5' 3" (1.6 m)   Wt (!) 145.6 kg (320 lb 15.8 oz)   LMP 06/21/2018 (Exact Date)   SpO2 98%   Breastfeeding? No   BMI 56.86 kg/m²       Pain/Uriah Score: Pain Assessment Performed: Yes (9/26/2018  6:17 AM)  Presence of Pain: denies (9/26/2018  8:00 AM)  Uriah Score: 10 (9/26/2018  8:00 AM)        "

## 2018-10-03 VITALS
BODY MASS INDEX: 51.91 KG/M2 | HEART RATE: 96 BPM | OXYGEN SATURATION: 98 % | HEIGHT: 63 IN | SYSTOLIC BLOOD PRESSURE: 125 MMHG | RESPIRATION RATE: 16 BRPM | TEMPERATURE: 98 F | WEIGHT: 293 LBS | DIASTOLIC BLOOD PRESSURE: 83 MMHG

## 2018-10-05 ENCOUNTER — OFFICE VISIT (OUTPATIENT)
Dept: OBSTETRICS AND GYNECOLOGY | Facility: CLINIC | Age: 38
End: 2018-10-05
Payer: COMMERCIAL

## 2018-10-05 ENCOUNTER — PROCEDURE VISIT (OUTPATIENT)
Dept: OBSTETRICS AND GYNECOLOGY | Facility: CLINIC | Age: 38
End: 2018-10-05
Payer: COMMERCIAL

## 2018-10-05 VITALS
BODY MASS INDEX: 51.91 KG/M2 | WEIGHT: 293 LBS | HEIGHT: 63 IN | DIASTOLIC BLOOD PRESSURE: 84 MMHG | SYSTOLIC BLOOD PRESSURE: 132 MMHG

## 2018-10-05 DIAGNOSIS — O34.32 INCOMPETENT CERVIX DURING SECOND TRIMESTER, ANTEPARTUM: ICD-10-CM

## 2018-10-05 DIAGNOSIS — O09.299 H/O INCOMPETENT CERVIX, CURRENTLY PREGNANT: ICD-10-CM

## 2018-10-05 DIAGNOSIS — O99.212 OBESITY AFFECTING PREGNANCY IN SECOND TRIMESTER: ICD-10-CM

## 2018-10-05 DIAGNOSIS — N88.3 INCOMPETENCE OF CERVIX: Primary | ICD-10-CM

## 2018-10-05 DIAGNOSIS — Z34.03 ENCOUNTER FOR SUPERVISION OF NORMAL FIRST PREGNANCY IN THIRD TRIMESTER: ICD-10-CM

## 2018-10-05 DIAGNOSIS — O34.32 INCOMPETENT CERVIX IN PREGNANCY, ANTEPARTUM, SECOND TRIMESTER: Primary | ICD-10-CM

## 2018-10-05 DIAGNOSIS — Z36.3 ENCOUNTER FOR ANTENATAL SCREENING FOR MALFORMATION USING ULTRASOUND: Primary | ICD-10-CM

## 2018-10-05 PROCEDURE — 87086 URINE CULTURE/COLONY COUNT: CPT

## 2018-10-05 PROCEDURE — 76816 OB US FOLLOW-UP PER FETUS: CPT | Mod: S$GLB,,, | Performed by: OBSTETRICS & GYNECOLOGY

## 2018-10-05 PROCEDURE — 99999 PR PBB SHADOW E&M-EST. PATIENT-LVL III: CPT | Mod: PBBFAC,,, | Performed by: OBSTETRICS & GYNECOLOGY

## 2018-10-05 PROCEDURE — 0502F SUBSEQUENT PRENATAL CARE: CPT | Mod: S$GLB,,, | Performed by: OBSTETRICS & GYNECOLOGY

## 2018-10-05 NOTE — PROGRESS NOTES
Reports feeling better--having lower back pains 4 days after cerclage placement but better now  sono--cervix 3.3 cm; cerclage in place, +fht  Quad screen next visit  Per patient see request denied  rx sent for nightly prometrium  C/o pressure at end of urination; rx sent  Anatomy sono next visit  Advised start daily aspirin (bmi)  Needs 1 hr glucola next visit

## 2018-10-06 RX ORDER — PROGESTERONE 200 MG/1
200 CAPSULE ORAL NIGHTLY
Qty: 30 CAPSULE | Refills: 10 | Status: SHIPPED | OUTPATIENT
Start: 2018-10-06 | End: 2019-03-06 | Stop reason: ALTCHOICE

## 2018-10-07 LAB
BACTERIA UR CULT: NORMAL
BACTERIA UR CULT: NORMAL

## 2018-10-30 ENCOUNTER — ROUTINE PRENATAL (OUTPATIENT)
Dept: OBSTETRICS AND GYNECOLOGY | Facility: CLINIC | Age: 38
End: 2018-10-30
Payer: COMMERCIAL

## 2018-10-30 ENCOUNTER — PROCEDURE VISIT (OUTPATIENT)
Dept: OBSTETRICS AND GYNECOLOGY | Facility: CLINIC | Age: 38
End: 2018-10-30
Payer: COMMERCIAL

## 2018-10-30 ENCOUNTER — LAB VISIT (OUTPATIENT)
Dept: LAB | Facility: HOSPITAL | Age: 38
End: 2018-10-30
Attending: ADVANCED PRACTICE MIDWIFE
Payer: COMMERCIAL

## 2018-10-30 VITALS — SYSTOLIC BLOOD PRESSURE: 112 MMHG | BODY MASS INDEX: 55.85 KG/M2 | WEIGHT: 293 LBS | DIASTOLIC BLOOD PRESSURE: 72 MMHG

## 2018-10-30 DIAGNOSIS — O09.299 H/O INCOMPETENT CERVIX, CURRENTLY PREGNANT: ICD-10-CM

## 2018-10-30 DIAGNOSIS — Z36.3 ENCOUNTER FOR ANTENATAL SCREENING FOR MALFORMATION USING ULTRASOUND: ICD-10-CM

## 2018-10-30 DIAGNOSIS — O99.212 OBESITY AFFECTING PREGNANCY IN SECOND TRIMESTER: ICD-10-CM

## 2018-10-30 DIAGNOSIS — O09.92 HIGH-RISK PREGNANCY IN SECOND TRIMESTER: ICD-10-CM

## 2018-10-30 DIAGNOSIS — O34.32 CERVICAL CERCLAGE SUTURE PRESENT IN SECOND TRIMESTER: Primary | ICD-10-CM

## 2018-10-30 PROCEDURE — 76805 OB US >/= 14 WKS SNGL FETUS: CPT | Mod: S$GLB,,, | Performed by: OBSTETRICS & GYNECOLOGY

## 2018-10-30 PROCEDURE — 36415 COLL VENOUS BLD VENIPUNCTURE: CPT | Mod: PO

## 2018-10-30 PROCEDURE — 0502F SUBSEQUENT PRENATAL CARE: CPT | Mod: S$GLB,,, | Performed by: ADVANCED PRACTICE MIDWIFE

## 2018-10-30 PROCEDURE — 87660 TRICHOMONAS VAGIN DIR PROBE: CPT

## 2018-10-30 PROCEDURE — 99999 PR PBB SHADOW E&M-EST. PATIENT-LVL III: CPT | Mod: PBBFAC,,, | Performed by: ADVANCED PRACTICE MIDWIFE

## 2018-10-30 PROCEDURE — 81511 FTL CGEN ABNOR FOUR ANAL: CPT

## 2018-10-30 RX ORDER — NAPROXEN SODIUM 220 MG/1
81 TABLET, FILM COATED ORAL DAILY
COMMUNITY
End: 2019-03-21

## 2018-10-31 LAB
CANDIDA RRNA VAG QL PROBE: NEGATIVE
G VAGINALIS RRNA GENITAL QL PROBE: NEGATIVE
T VAGINALIS RRNA GENITAL QL PROBE: NEGATIVE

## 2018-10-31 NOTE — PROGRESS NOTES
Pt reports no complaints.  US reviewed, anatomy incomplete will repeat 4 weeks.  Normal cervical length today.  Affirm negative today.  QUAD screen drawn.  Benefits of BF and skin to skin discussed, remains undecided of feeding method.  Coffective counseling sheet Learn Your Baby discussed with mother. Instructed regarding feeding cues and methods to calm baby. Encouraged mother to download Coffective mobile arina if she has not already done so.  Mother verbalized understanding.  States taking ASA and Prometrium as directed

## 2018-11-01 LAB
# FETUSES US: NORMAL
2ND TRIMESTER 4 SCREEN PNL SERPL: NEGATIVE
2ND TRIMESTER 4 SCREEN SERPL-IMP: NORMAL
AFP MOM SERPL: 2.17
AFP SERPL-MCNC: 71.5 NG/ML
AGE AT DELIVERY: 36
B-HCG MOM SERPL: 1.46
B-HCG SERPL-ACNC: 22.2 IU/ML
FET TS 21 RISK FROM MAT AGE: NORMAL
GA (DAYS): 1 D
GA (WEEKS): 19 WK
GA METHOD: NORMAL
IDDM PATIENT QL: NORMAL
INHIBIN A MOM SERPL: 1.44
INHIBIN A SERPL-MCNC: 167 PG/ML
SMOKING STATUS FTND: NO
TS 18 RISK FETUS: NORMAL
TS 21 RISK FETUS: NORMAL
U ESTRIOL MOM SERPL: 1.27
U ESTRIOL SERPL-MCNC: 1.51 NG/ML

## 2018-11-27 ENCOUNTER — LAB VISIT (OUTPATIENT)
Dept: LAB | Facility: HOSPITAL | Age: 38
End: 2018-11-27
Attending: ADVANCED PRACTICE MIDWIFE
Payer: COMMERCIAL

## 2018-11-27 ENCOUNTER — PROCEDURE VISIT (OUTPATIENT)
Dept: OBSTETRICS AND GYNECOLOGY | Facility: CLINIC | Age: 38
End: 2018-11-27
Payer: COMMERCIAL

## 2018-11-27 ENCOUNTER — ROUTINE PRENATAL (OUTPATIENT)
Dept: OBSTETRICS AND GYNECOLOGY | Facility: CLINIC | Age: 38
End: 2018-11-27
Payer: COMMERCIAL

## 2018-11-27 VITALS — DIASTOLIC BLOOD PRESSURE: 74 MMHG | WEIGHT: 293 LBS | SYSTOLIC BLOOD PRESSURE: 120 MMHG | BODY MASS INDEX: 52.57 KG/M2

## 2018-11-27 DIAGNOSIS — O09.522 ELDERLY MULTIGRAVIDA IN SECOND TRIMESTER: ICD-10-CM

## 2018-11-27 DIAGNOSIS — O99.212 OBESITY AFFECTING PREGNANCY IN SECOND TRIMESTER: Primary | ICD-10-CM

## 2018-11-27 DIAGNOSIS — O34.32 CERVICAL CERCLAGE SUTURE PRESENT IN SECOND TRIMESTER: ICD-10-CM

## 2018-11-27 DIAGNOSIS — O09.92 HIGH-RISK PREGNANCY IN SECOND TRIMESTER: ICD-10-CM

## 2018-11-27 DIAGNOSIS — O34.32 INCOMPETENT CERVIX DURING SECOND TRIMESTER, ANTEPARTUM: ICD-10-CM

## 2018-11-27 DIAGNOSIS — O99.212 OBESITY AFFECTING PREGNANCY IN SECOND TRIMESTER: ICD-10-CM

## 2018-11-27 DIAGNOSIS — O09.299 H/O INCOMPETENT CERVIX, CURRENTLY PREGNANT: ICD-10-CM

## 2018-11-27 LAB
ESTIMATED AVG GLUCOSE: 105 MG/DL
HBA1C MFR BLD HPLC: 5.3 %

## 2018-11-27 PROCEDURE — 0502F SUBSEQUENT PRENATAL CARE: CPT | Mod: S$GLB,,, | Performed by: ADVANCED PRACTICE MIDWIFE

## 2018-11-27 PROCEDURE — 99999 PR PBB SHADOW E&M-EST. PATIENT-LVL III: CPT | Mod: PBBFAC,,, | Performed by: ADVANCED PRACTICE MIDWIFE

## 2018-11-27 PROCEDURE — 83036 HEMOGLOBIN GLYCOSYLATED A1C: CPT

## 2018-11-27 PROCEDURE — 90471 IMMUNIZATION ADMIN: CPT | Mod: S$GLB,,, | Performed by: OBSTETRICS & GYNECOLOGY

## 2018-11-27 PROCEDURE — 90686 IIV4 VACC NO PRSV 0.5 ML IM: CPT | Mod: S$GLB,,, | Performed by: OBSTETRICS & GYNECOLOGY

## 2018-11-27 PROCEDURE — 76805 OB US >/= 14 WKS SNGL FETUS: CPT | Mod: S$GLB,,, | Performed by: OBSTETRICS & GYNECOLOGY

## 2018-11-27 PROCEDURE — 36415 COLL VENOUS BLD VENIPUNCTURE: CPT | Mod: PO

## 2018-11-27 NOTE — PROGRESS NOTES
Anatomy scan complete.  EFW 28%. Reports flutters.  States will bottle feed, benefits of BF reviewed, verbalized understanding.  Flu vaccine and A1c today.  RTC 4 weeks

## 2018-11-27 NOTE — NURSING
Pt identified using 2 identifiers, name and . Pt agreed to flu injection. Given in L deltoid. Pt tolerated without complaints. Pt instructed to wait 15 min to monitor for S&S, verbalized understanding. No S&S noted at this time.     ONEYDA Murillo LPN

## 2018-12-26 ENCOUNTER — ROUTINE PRENATAL (OUTPATIENT)
Dept: OBSTETRICS AND GYNECOLOGY | Facility: CLINIC | Age: 38
End: 2018-12-26
Payer: COMMERCIAL

## 2018-12-26 VITALS — DIASTOLIC BLOOD PRESSURE: 70 MMHG | SYSTOLIC BLOOD PRESSURE: 122 MMHG | WEIGHT: 293 LBS | BODY MASS INDEX: 54.07 KG/M2

## 2018-12-26 DIAGNOSIS — O09.522 ELDERLY MULTIGRAVIDA IN SECOND TRIMESTER: ICD-10-CM

## 2018-12-26 DIAGNOSIS — O99.212 OBESITY AFFECTING PREGNANCY IN SECOND TRIMESTER: Primary | ICD-10-CM

## 2018-12-26 DIAGNOSIS — O09.92 HIGH-RISK PREGNANCY IN SECOND TRIMESTER: ICD-10-CM

## 2018-12-26 PROCEDURE — 0502F SUBSEQUENT PRENATAL CARE: CPT | Mod: S$GLB,,, | Performed by: ADVANCED PRACTICE MIDWIFE

## 2018-12-26 PROCEDURE — 99999 PR PBB SHADOW E&M-EST. PATIENT-LVL III: CPT | Mod: PBBFAC,,, | Performed by: ADVANCED PRACTICE MIDWIFE

## 2018-12-26 NOTE — PROGRESS NOTES
Doing well today. Having some heartburn, discussed taking zantac and tums as well as normal discomforts of pregnancy.   Having some constipation. Discussed taking daily stool softener and increased fruit/veggie intake. Told to avoid laxatives.   Taking daily prometrium. Denies VB/LOF/UC.   Discussed 3rd tri labs for nv.  Encouraged to increase hydration.

## 2019-01-09 ENCOUNTER — ROUTINE PRENATAL (OUTPATIENT)
Dept: OBSTETRICS AND GYNECOLOGY | Facility: CLINIC | Age: 39
End: 2019-01-09
Payer: COMMERCIAL

## 2019-01-09 ENCOUNTER — LAB VISIT (OUTPATIENT)
Dept: LAB | Facility: HOSPITAL | Age: 39
End: 2019-01-09
Attending: ADVANCED PRACTICE MIDWIFE
Payer: COMMERCIAL

## 2019-01-09 VITALS — SYSTOLIC BLOOD PRESSURE: 118 MMHG | DIASTOLIC BLOOD PRESSURE: 84 MMHG | WEIGHT: 293 LBS | BODY MASS INDEX: 53.89 KG/M2

## 2019-01-09 DIAGNOSIS — O99.212 OBESITY AFFECTING PREGNANCY IN SECOND TRIMESTER: ICD-10-CM

## 2019-01-09 DIAGNOSIS — Z23 NEED FOR DIPHTHERIA-TETANUS-PERTUSSIS (TDAP) VACCINE: ICD-10-CM

## 2019-01-09 DIAGNOSIS — O09.523 ELDERLY MULTIGRAVIDA IN THIRD TRIMESTER: Primary | ICD-10-CM

## 2019-01-09 DIAGNOSIS — O99.213 OBESITY AFFECTING PREGNANCY IN THIRD TRIMESTER: ICD-10-CM

## 2019-01-09 DIAGNOSIS — O09.93 HIGH-RISK PREGNANCY IN THIRD TRIMESTER: ICD-10-CM

## 2019-01-09 LAB
BASOPHILS # BLD AUTO: 0.02 K/UL
BASOPHILS NFR BLD: 0.4 %
DIFFERENTIAL METHOD: ABNORMAL
EOSINOPHIL # BLD AUTO: 0 K/UL
EOSINOPHIL NFR BLD: 0.8 %
ERYTHROCYTE [DISTWIDTH] IN BLOOD BY AUTOMATED COUNT: 13.9 %
GLUCOSE SERPL-MCNC: 110 MG/DL
HCT VFR BLD AUTO: 33.6 %
HGB BLD-MCNC: 10.8 G/DL
IMM GRANULOCYTES # BLD AUTO: 0.03 K/UL
IMM GRANULOCYTES NFR BLD AUTO: 0.6 %
LYMPHOCYTES # BLD AUTO: 1.5 K/UL
LYMPHOCYTES NFR BLD: 28.4 %
MCH RBC QN AUTO: 28.5 PG
MCHC RBC AUTO-ENTMCNC: 32.1 G/DL
MCV RBC AUTO: 89 FL
MONOCYTES # BLD AUTO: 0.4 K/UL
MONOCYTES NFR BLD: 7.8 %
NEUTROPHILS # BLD AUTO: 3.2 K/UL
NEUTROPHILS NFR BLD: 62 %
NRBC BLD-RTO: 0 /100 WBC
PLATELET # BLD AUTO: 523 K/UL
PMV BLD AUTO: 9.9 FL
RBC # BLD AUTO: 3.79 M/UL
WBC # BLD AUTO: 5.1 K/UL

## 2019-01-09 PROCEDURE — 86703 HIV-1/HIV-2 1 RESULT ANTBDY: CPT

## 2019-01-09 PROCEDURE — 90471 TDAP VACCINE GREATER THAN OR EQUAL TO 7YO IM: ICD-10-PCS | Mod: S$GLB,,, | Performed by: OBSTETRICS & GYNECOLOGY

## 2019-01-09 PROCEDURE — 85025 COMPLETE CBC W/AUTO DIFF WBC: CPT

## 2019-01-09 PROCEDURE — 82950 GLUCOSE TEST: CPT

## 2019-01-09 PROCEDURE — 90471 IMMUNIZATION ADMIN: CPT | Mod: S$GLB,,, | Performed by: OBSTETRICS & GYNECOLOGY

## 2019-01-09 PROCEDURE — 90715 TDAP VACCINE GREATER THAN OR EQUAL TO 7YO IM: ICD-10-PCS | Mod: S$GLB,,, | Performed by: OBSTETRICS & GYNECOLOGY

## 2019-01-09 PROCEDURE — 90715 TDAP VACCINE 7 YRS/> IM: CPT | Mod: S$GLB,,, | Performed by: OBSTETRICS & GYNECOLOGY

## 2019-01-09 PROCEDURE — 86592 SYPHILIS TEST NON-TREP QUAL: CPT

## 2019-01-09 PROCEDURE — 99999 PR PBB SHADOW E&M-EST. PATIENT-LVL III: CPT | Mod: PBBFAC,,, | Performed by: ADVANCED PRACTICE MIDWIFE

## 2019-01-09 PROCEDURE — 36415 COLL VENOUS BLD VENIPUNCTURE: CPT | Mod: PO

## 2019-01-09 PROCEDURE — 0502F PR SUBSEQUENT PRENATAL CARE: ICD-10-PCS | Mod: S$GLB,,, | Performed by: ADVANCED PRACTICE MIDWIFE

## 2019-01-09 PROCEDURE — 99999 PR PBB SHADOW E&M-EST. PATIENT-LVL III: ICD-10-PCS | Mod: PBBFAC,,, | Performed by: ADVANCED PRACTICE MIDWIFE

## 2019-01-09 PROCEDURE — 0502F SUBSEQUENT PRENATAL CARE: CPT | Mod: S$GLB,,, | Performed by: ADVANCED PRACTICE MIDWIFE

## 2019-01-09 NOTE — PROGRESS NOTES
Doing well today with no complaints.   Heartburn is much better with daily zantac and constipation is improved with stool softener.   3rd tri labs in progress today, will discuss results nv.   Tdap today.   Desires BTL for contraception. Consents signed today.  Encouraged to increase hydration.

## 2019-01-09 NOTE — PROGRESS NOTES
After identifying patient with 2 identifiers, verifying that patient wished to receive Tdap, reviewing allergies, 0.5 ml Tdap administered to L deltoid. Patient tolerated well.  Patient instructed to wait 15 minutes and verbalized understanding.  Next appointment scheduled and AVS printed and given to patient.

## 2019-01-10 DIAGNOSIS — O99.019 ANEMIA DURING PREGNANCY: Primary | ICD-10-CM

## 2019-01-10 PROBLEM — D75.839 THROMBOCYTOSIS: Status: ACTIVE | Noted: 2019-01-10

## 2019-01-10 LAB
HIV 1+2 AB+HIV1 P24 AG SERPL QL IA: NEGATIVE
RPR SER QL: NORMAL

## 2019-01-10 RX ORDER — FERROUS SULFATE 325(65) MG
325 TABLET ORAL DAILY
Qty: 30 TABLET | Refills: 11 | Status: ON HOLD | OUTPATIENT
Start: 2019-01-10 | End: 2019-03-30 | Stop reason: SDUPTHER

## 2019-01-23 ENCOUNTER — ROUTINE PRENATAL (OUTPATIENT)
Dept: OBSTETRICS AND GYNECOLOGY | Facility: CLINIC | Age: 39
End: 2019-01-23
Payer: COMMERCIAL

## 2019-01-23 VITALS — SYSTOLIC BLOOD PRESSURE: 108 MMHG | BODY MASS INDEX: 54.56 KG/M2 | DIASTOLIC BLOOD PRESSURE: 78 MMHG | WEIGHT: 293 LBS

## 2019-01-23 DIAGNOSIS — O09.93 HIGH-RISK PREGNANCY IN THIRD TRIMESTER: Primary | ICD-10-CM

## 2019-01-23 DIAGNOSIS — O99.213 OBESITY AFFECTING PREGNANCY IN THIRD TRIMESTER: ICD-10-CM

## 2019-01-23 DIAGNOSIS — O09.523 ELDERLY MULTIGRAVIDA IN THIRD TRIMESTER: ICD-10-CM

## 2019-01-23 PROCEDURE — 99999 PR PBB SHADOW E&M-EST. PATIENT-LVL II: CPT | Mod: PBBFAC,,, | Performed by: ADVANCED PRACTICE MIDWIFE

## 2019-01-23 PROCEDURE — 99999 PR PBB SHADOW E&M-EST. PATIENT-LVL II: ICD-10-PCS | Mod: PBBFAC,,, | Performed by: ADVANCED PRACTICE MIDWIFE

## 2019-01-23 PROCEDURE — 0502F SUBSEQUENT PRENATAL CARE: CPT | Mod: S$GLB,,, | Performed by: ADVANCED PRACTICE MIDWIFE

## 2019-01-23 PROCEDURE — 0502F PR SUBSEQUENT PRENATAL CARE: ICD-10-PCS | Mod: S$GLB,,, | Performed by: ADVANCED PRACTICE MIDWIFE

## 2019-01-23 NOTE — PROGRESS NOTES
"Doing well today. Having some more lower back discomfort. Discussed common discomforts of pregnancy. Encouraged warm soaks in bath, increasing hydration, and tylenol.   Reviewed 3rd tri labs.   Reviewed ROM/PTL precautions.   Will start weekly US nv.   "Quiet hours" discussed      "

## 2019-02-05 ENCOUNTER — ROUTINE PRENATAL (OUTPATIENT)
Dept: OBSTETRICS AND GYNECOLOGY | Facility: CLINIC | Age: 39
End: 2019-02-05
Payer: COMMERCIAL

## 2019-02-05 ENCOUNTER — PROCEDURE VISIT (OUTPATIENT)
Dept: OBSTETRICS AND GYNECOLOGY | Facility: CLINIC | Age: 39
End: 2019-02-05
Payer: COMMERCIAL

## 2019-02-05 VITALS — DIASTOLIC BLOOD PRESSURE: 78 MMHG | WEIGHT: 293 LBS | BODY MASS INDEX: 54.5 KG/M2 | SYSTOLIC BLOOD PRESSURE: 112 MMHG

## 2019-02-05 DIAGNOSIS — O09.299 H/O INCOMPETENT CERVIX, CURRENTLY PREGNANT: ICD-10-CM

## 2019-02-05 DIAGNOSIS — O09.93 HIGH-RISK PREGNANCY IN THIRD TRIMESTER: ICD-10-CM

## 2019-02-05 DIAGNOSIS — O09.523 ELDERLY MULTIGRAVIDA IN THIRD TRIMESTER: Primary | ICD-10-CM

## 2019-02-05 DIAGNOSIS — O99.213 OBESITY AFFECTING PREGNANCY IN THIRD TRIMESTER: ICD-10-CM

## 2019-02-05 DIAGNOSIS — O09.523 ELDERLY MULTIGRAVIDA IN THIRD TRIMESTER: ICD-10-CM

## 2019-02-05 PROCEDURE — 76819 FETAL BIOPHYS PROFIL W/O NST: CPT | Mod: S$GLB,,, | Performed by: OBSTETRICS & GYNECOLOGY

## 2019-02-05 PROCEDURE — 0502F SUBSEQUENT PRENATAL CARE: CPT | Mod: CPTII,S$GLB,, | Performed by: ADVANCED PRACTICE MIDWIFE

## 2019-02-05 PROCEDURE — 76816 OB US FOLLOW-UP PER FETUS: CPT | Mod: S$GLB,,, | Performed by: OBSTETRICS & GYNECOLOGY

## 2019-02-05 PROCEDURE — 76816 PR  US,PREGNANT UTERUS,F/U,TRANSABD APP: ICD-10-PCS | Mod: S$GLB,,, | Performed by: OBSTETRICS & GYNECOLOGY

## 2019-02-05 PROCEDURE — 76819 PR US, OB, FETAL BIOPHYSICAL, W/O NST: ICD-10-PCS | Mod: S$GLB,,, | Performed by: OBSTETRICS & GYNECOLOGY

## 2019-02-05 PROCEDURE — 99999 PR PBB SHADOW E&M-EST. PATIENT-LVL III: ICD-10-PCS | Mod: PBBFAC,,, | Performed by: ADVANCED PRACTICE MIDWIFE

## 2019-02-05 PROCEDURE — 99999 PR PBB SHADOW E&M-EST. PATIENT-LVL III: CPT | Mod: PBBFAC,,, | Performed by: ADVANCED PRACTICE MIDWIFE

## 2019-02-05 PROCEDURE — 0502F PR SUBSEQUENT PRENATAL CARE: ICD-10-PCS | Mod: CPTII,S$GLB,, | Performed by: ADVANCED PRACTICE MIDWIFE

## 2019-02-05 NOTE — PROGRESS NOTES
"Doing well today. Having trouble sleeping. Discussed establishing a nightly routine and trying benadryl or unisom.   US today shows cephalic presentation, anterior G2 placenta, 3VC, MVP 4.5cm, SHAQUILLE 15.8cm, EFW 4#8oz (27%), and BPP 8/8.  Discussed PTL/ROM/FKC precautions.     "Quiet hours" discussed    "

## 2019-02-11 ENCOUNTER — ROUTINE PRENATAL (OUTPATIENT)
Dept: OBSTETRICS AND GYNECOLOGY | Facility: CLINIC | Age: 39
End: 2019-02-11
Payer: COMMERCIAL

## 2019-02-11 ENCOUNTER — PROCEDURE VISIT (OUTPATIENT)
Dept: OBSTETRICS AND GYNECOLOGY | Facility: CLINIC | Age: 39
End: 2019-02-11
Payer: COMMERCIAL

## 2019-02-11 VITALS — SYSTOLIC BLOOD PRESSURE: 124 MMHG | DIASTOLIC BLOOD PRESSURE: 82 MMHG | BODY MASS INDEX: 54.28 KG/M2 | WEIGHT: 293 LBS

## 2019-02-11 DIAGNOSIS — O09.299 H/O INCOMPETENT CERVIX, CURRENTLY PREGNANT: ICD-10-CM

## 2019-02-11 DIAGNOSIS — O09.93 HIGH-RISK PREGNANCY IN THIRD TRIMESTER: Primary | ICD-10-CM

## 2019-02-11 DIAGNOSIS — O09.523 ELDERLY MULTIGRAVIDA IN THIRD TRIMESTER: ICD-10-CM

## 2019-02-11 DIAGNOSIS — O26.893 HEARTBURN DURING PREGNANCY IN THIRD TRIMESTER: ICD-10-CM

## 2019-02-11 DIAGNOSIS — R12 HEARTBURN DURING PREGNANCY IN THIRD TRIMESTER: ICD-10-CM

## 2019-02-11 DIAGNOSIS — O09.93 HIGH-RISK PREGNANCY IN THIRD TRIMESTER: ICD-10-CM

## 2019-02-11 DIAGNOSIS — O99.213 OBESITY AFFECTING PREGNANCY IN THIRD TRIMESTER: ICD-10-CM

## 2019-02-11 PROCEDURE — 99999 PR PBB SHADOW E&M-EST. PATIENT-LVL II: CPT | Mod: PBBFAC,,, | Performed by: ADVANCED PRACTICE MIDWIFE

## 2019-02-11 PROCEDURE — 0502F PR SUBSEQUENT PRENATAL CARE: ICD-10-PCS | Mod: CPTII,S$GLB,, | Performed by: ADVANCED PRACTICE MIDWIFE

## 2019-02-11 PROCEDURE — 99999 PR PBB SHADOW E&M-EST. PATIENT-LVL II: ICD-10-PCS | Mod: PBBFAC,,, | Performed by: ADVANCED PRACTICE MIDWIFE

## 2019-02-11 PROCEDURE — 76819 PR US, OB, FETAL BIOPHYSICAL, W/O NST: ICD-10-PCS | Mod: S$GLB,,, | Performed by: OBSTETRICS & GYNECOLOGY

## 2019-02-11 PROCEDURE — 76819 FETAL BIOPHYS PROFIL W/O NST: CPT | Mod: S$GLB,,, | Performed by: OBSTETRICS & GYNECOLOGY

## 2019-02-11 PROCEDURE — 0502F SUBSEQUENT PRENATAL CARE: CPT | Mod: CPTII,S$GLB,, | Performed by: ADVANCED PRACTICE MIDWIFE

## 2019-02-11 RX ORDER — PANTOPRAZOLE SODIUM 20 MG/1
20 TABLET, DELAYED RELEASE ORAL DAILY
Qty: 30 TABLET | Refills: 1 | Status: SHIPPED | OUTPATIENT
Start: 2019-02-11 | End: 2019-03-06

## 2019-02-11 NOTE — PROGRESS NOTES
Doing well today. Having some gas pain and more heart burn. Discussed use of gas ex. Sent in rx to try protonix.   Discussed GBS for 35 weeks and stopping prometrium at that visit.   BPP today 8/8, SHAQUILLE 12.8cm, placenta anterior and cephalic presentation.   Discussed ROM/Bleeding/FKC precautions.   Encouraged to increase hydration.

## 2019-02-13 ENCOUNTER — PATIENT MESSAGE (OUTPATIENT)
Dept: OBSTETRICS AND GYNECOLOGY | Facility: CLINIC | Age: 39
End: 2019-02-13

## 2019-02-21 ENCOUNTER — ROUTINE PRENATAL (OUTPATIENT)
Dept: OBSTETRICS AND GYNECOLOGY | Facility: CLINIC | Age: 39
End: 2019-02-21
Payer: COMMERCIAL

## 2019-02-21 ENCOUNTER — PROCEDURE VISIT (OUTPATIENT)
Dept: OBSTETRICS AND GYNECOLOGY | Facility: CLINIC | Age: 39
End: 2019-02-21
Payer: COMMERCIAL

## 2019-02-21 VITALS — BODY MASS INDEX: 55.06 KG/M2 | WEIGHT: 293 LBS | SYSTOLIC BLOOD PRESSURE: 122 MMHG | DIASTOLIC BLOOD PRESSURE: 80 MMHG

## 2019-02-21 DIAGNOSIS — O09.93 HIGH-RISK PREGNANCY IN THIRD TRIMESTER: ICD-10-CM

## 2019-02-21 DIAGNOSIS — O09.523 ELDERLY MULTIGRAVIDA IN THIRD TRIMESTER: Primary | ICD-10-CM

## 2019-02-21 DIAGNOSIS — O09.299 H/O INCOMPETENT CERVIX, CURRENTLY PREGNANT: ICD-10-CM

## 2019-02-21 DIAGNOSIS — O09.523 ELDERLY MULTIGRAVIDA IN THIRD TRIMESTER: ICD-10-CM

## 2019-02-21 DIAGNOSIS — O99.213 OBESITY AFFECTING PREGNANCY IN THIRD TRIMESTER: ICD-10-CM

## 2019-02-21 PROCEDURE — 76819 FETAL BIOPHYS PROFIL W/O NST: CPT | Mod: S$GLB,,, | Performed by: OBSTETRICS & GYNECOLOGY

## 2019-02-21 PROCEDURE — 87081 CULTURE SCREEN ONLY: CPT

## 2019-02-21 PROCEDURE — 0502F PR SUBSEQUENT PRENATAL CARE: ICD-10-PCS | Mod: CPTII,S$GLB,, | Performed by: ADVANCED PRACTICE MIDWIFE

## 2019-02-21 PROCEDURE — 76819 PR US, OB, FETAL BIOPHYSICAL, W/O NST: ICD-10-PCS | Mod: S$GLB,,, | Performed by: OBSTETRICS & GYNECOLOGY

## 2019-02-21 PROCEDURE — 0502F SUBSEQUENT PRENATAL CARE: CPT | Mod: CPTII,S$GLB,, | Performed by: ADVANCED PRACTICE MIDWIFE

## 2019-02-21 PROCEDURE — 99999 PR PBB SHADOW E&M-EST. PATIENT-LVL III: ICD-10-PCS | Mod: PBBFAC,,, | Performed by: ADVANCED PRACTICE MIDWIFE

## 2019-02-21 PROCEDURE — 99999 PR PBB SHADOW E&M-EST. PATIENT-LVL III: CPT | Mod: PBBFAC,,, | Performed by: ADVANCED PRACTICE MIDWIFE

## 2019-02-21 NOTE — PROGRESS NOTES
Doing well today with no complaints.   GBS collected. VE per request. Cervix soft and anterior. Discussed removing cerclage next week. Will stop prometrium this week.   Denies contractions, LOF, or VB.   US today shows BPP 8/8, MVP 5, SHAQUILLE 16, anterior G2 placenta. Cephalic presentation.   Reviewed FKC/ROM/PTL/Bleeding precautions.

## 2019-02-25 LAB — BACTERIA SPEC AEROBE CULT: NORMAL

## 2019-02-28 ENCOUNTER — ROUTINE PRENATAL (OUTPATIENT)
Dept: OBSTETRICS AND GYNECOLOGY | Facility: CLINIC | Age: 39
End: 2019-02-28
Payer: COMMERCIAL

## 2019-02-28 VITALS — BODY MASS INDEX: 56.24 KG/M2 | SYSTOLIC BLOOD PRESSURE: 128 MMHG | WEIGHT: 293 LBS | DIASTOLIC BLOOD PRESSURE: 76 MMHG

## 2019-02-28 DIAGNOSIS — O09.93 HIGH-RISK PREGNANCY IN THIRD TRIMESTER: ICD-10-CM

## 2019-02-28 DIAGNOSIS — O09.523 ELDERLY MULTIGRAVIDA IN THIRD TRIMESTER: Primary | ICD-10-CM

## 2019-02-28 PROCEDURE — 0502F PR SUBSEQUENT PRENATAL CARE: ICD-10-PCS | Mod: CPTII,S$GLB,, | Performed by: OBSTETRICS & GYNECOLOGY

## 2019-02-28 PROCEDURE — 0502F SUBSEQUENT PRENATAL CARE: CPT | Mod: CPTII,S$GLB,, | Performed by: OBSTETRICS & GYNECOLOGY

## 2019-02-28 PROCEDURE — 99999 PR PBB SHADOW E&M-EST. PATIENT-LVL III: ICD-10-PCS | Mod: PBBFAC,,, | Performed by: OBSTETRICS & GYNECOLOGY

## 2019-02-28 PROCEDURE — 99999 PR PBB SHADOW E&M-EST. PATIENT-LVL III: CPT | Mod: PBBFAC,,, | Performed by: OBSTETRICS & GYNECOLOGY

## 2019-02-28 NOTE — PROGRESS NOTES
+fetal movement, no srom, no vaginal bleeding  Continues on daily aspirin  Continue weekly bpp and visit    Cerclage removed today; --speculum placed, ring forcep used to grasp stitch; stitch cut and removed intact  Patient tolerated procedure well    C/o swollen edema--advised low salt diet; elevated leg; increase water ; pt plans to use support stockings  Vtx, ballot  gbs negative

## 2019-03-06 ENCOUNTER — ROUTINE PRENATAL (OUTPATIENT)
Dept: OBSTETRICS AND GYNECOLOGY | Facility: CLINIC | Age: 39
End: 2019-03-06
Payer: COMMERCIAL

## 2019-03-06 ENCOUNTER — PROCEDURE VISIT (OUTPATIENT)
Dept: OBSTETRICS AND GYNECOLOGY | Facility: CLINIC | Age: 39
End: 2019-03-06
Payer: COMMERCIAL

## 2019-03-06 VITALS — SYSTOLIC BLOOD PRESSURE: 122 MMHG | BODY MASS INDEX: 56.24 KG/M2 | WEIGHT: 293 LBS | DIASTOLIC BLOOD PRESSURE: 84 MMHG

## 2019-03-06 DIAGNOSIS — O09.523 ELDERLY MULTIGRAVIDA IN THIRD TRIMESTER: ICD-10-CM

## 2019-03-06 DIAGNOSIS — O09.93 HIGH-RISK PREGNANCY IN THIRD TRIMESTER: ICD-10-CM

## 2019-03-06 DIAGNOSIS — O99.213 OBESITY AFFECTING PREGNANCY IN THIRD TRIMESTER: ICD-10-CM

## 2019-03-06 DIAGNOSIS — O09.299 H/O INCOMPETENT CERVIX, CURRENTLY PREGNANT: ICD-10-CM

## 2019-03-06 DIAGNOSIS — O09.93 HIGH-RISK PREGNANCY IN THIRD TRIMESTER: Primary | ICD-10-CM

## 2019-03-06 PROCEDURE — 76819 FETAL BIOPHYS PROFIL W/O NST: CPT | Mod: S$GLB,,, | Performed by: OBSTETRICS & GYNECOLOGY

## 2019-03-06 PROCEDURE — 76819 PR US, OB, FETAL BIOPHYSICAL, W/O NST: ICD-10-PCS | Mod: S$GLB,,, | Performed by: OBSTETRICS & GYNECOLOGY

## 2019-03-06 PROCEDURE — 99999 PR PBB SHADOW E&M-EST. PATIENT-LVL II: ICD-10-PCS | Mod: PBBFAC,,, | Performed by: ADVANCED PRACTICE MIDWIFE

## 2019-03-06 PROCEDURE — 99999 PR PBB SHADOW E&M-EST. PATIENT-LVL II: CPT | Mod: PBBFAC,,, | Performed by: ADVANCED PRACTICE MIDWIFE

## 2019-03-06 PROCEDURE — 76816 OB US FOLLOW-UP PER FETUS: CPT | Mod: S$GLB,,, | Performed by: OBSTETRICS & GYNECOLOGY

## 2019-03-06 PROCEDURE — 0502F SUBSEQUENT PRENATAL CARE: CPT | Mod: CPTII,S$GLB,, | Performed by: ADVANCED PRACTICE MIDWIFE

## 2019-03-06 PROCEDURE — 0502F PR SUBSEQUENT PRENATAL CARE: ICD-10-PCS | Mod: CPTII,S$GLB,, | Performed by: ADVANCED PRACTICE MIDWIFE

## 2019-03-06 PROCEDURE — 76816 PR  US,PREGNANT UTERUS,F/U,TRANSABD APP: ICD-10-PCS | Mod: S$GLB,,, | Performed by: OBSTETRICS & GYNECOLOGY

## 2019-03-06 NOTE — PROGRESS NOTES
Doing well today with no complaints. Still having some lower leg swelling, but better this week.   US today shows BPP 8/8, SHAQUILLE 13.2, MVP 5.0, 3VC, placenta anterior, cephalic presentation and EFW 6#5oz (31%).   Reviewed FKC/ROM/Labor precautions.   Discussed GBS negative

## 2019-03-12 ENCOUNTER — PROCEDURE VISIT (OUTPATIENT)
Dept: OBSTETRICS AND GYNECOLOGY | Facility: CLINIC | Age: 39
End: 2019-03-12
Payer: COMMERCIAL

## 2019-03-12 ENCOUNTER — ROUTINE PRENATAL (OUTPATIENT)
Dept: OBSTETRICS AND GYNECOLOGY | Facility: CLINIC | Age: 39
End: 2019-03-12
Payer: COMMERCIAL

## 2019-03-12 VITALS — BODY MASS INDEX: 55.45 KG/M2 | SYSTOLIC BLOOD PRESSURE: 124 MMHG | WEIGHT: 293 LBS | DIASTOLIC BLOOD PRESSURE: 86 MMHG

## 2019-03-12 DIAGNOSIS — O09.93 HIGH-RISK PREGNANCY IN THIRD TRIMESTER: ICD-10-CM

## 2019-03-12 DIAGNOSIS — O09.523 ELDERLY MULTIGRAVIDA IN THIRD TRIMESTER: ICD-10-CM

## 2019-03-12 DIAGNOSIS — O99.213 OBESITY AFFECTING PREGNANCY IN THIRD TRIMESTER: ICD-10-CM

## 2019-03-12 DIAGNOSIS — O09.523 ELDERLY MULTIGRAVIDA IN THIRD TRIMESTER: Primary | ICD-10-CM

## 2019-03-12 DIAGNOSIS — O09.299 H/O INCOMPETENT CERVIX, CURRENTLY PREGNANT: ICD-10-CM

## 2019-03-12 PROCEDURE — 76819 FETAL BIOPHYS PROFIL W/O NST: CPT | Mod: S$GLB,,, | Performed by: OBSTETRICS & GYNECOLOGY

## 2019-03-12 PROCEDURE — 99999 PR PBB SHADOW E&M-EST. PATIENT-LVL II: ICD-10-PCS | Mod: PBBFAC,,, | Performed by: ADVANCED PRACTICE MIDWIFE

## 2019-03-12 PROCEDURE — 76819 PR US, OB, FETAL BIOPHYSICAL, W/O NST: ICD-10-PCS | Mod: S$GLB,,, | Performed by: OBSTETRICS & GYNECOLOGY

## 2019-03-12 PROCEDURE — 0502F PR SUBSEQUENT PRENATAL CARE: ICD-10-PCS | Mod: CPTII,S$GLB,, | Performed by: ADVANCED PRACTICE MIDWIFE

## 2019-03-12 PROCEDURE — 99999 PR PBB SHADOW E&M-EST. PATIENT-LVL II: CPT | Mod: PBBFAC,,, | Performed by: ADVANCED PRACTICE MIDWIFE

## 2019-03-12 PROCEDURE — 0502F SUBSEQUENT PRENATAL CARE: CPT | Mod: CPTII,S$GLB,, | Performed by: ADVANCED PRACTICE MIDWIFE

## 2019-03-12 NOTE — PROGRESS NOTES
Doing well today with no complaints. Ready for baby.   Denies LOF or VB.   Declines VE.   US today shows BPP 8/8, cephalic presentation, anterior placenta, 3 VC, MVP 5.7 and SHAQUILLE 17.3.   Reviewed FKC/ROM/Labor/BLeeding precautions.

## 2019-03-18 ENCOUNTER — PATIENT MESSAGE (OUTPATIENT)
Dept: OBSTETRICS AND GYNECOLOGY | Facility: CLINIC | Age: 39
End: 2019-03-18

## 2019-03-21 ENCOUNTER — PROCEDURE VISIT (OUTPATIENT)
Dept: OBSTETRICS AND GYNECOLOGY | Facility: CLINIC | Age: 39
End: 2019-03-21
Payer: COMMERCIAL

## 2019-03-21 ENCOUNTER — ROUTINE PRENATAL (OUTPATIENT)
Dept: OBSTETRICS AND GYNECOLOGY | Facility: CLINIC | Age: 39
End: 2019-03-21
Payer: COMMERCIAL

## 2019-03-21 VITALS — SYSTOLIC BLOOD PRESSURE: 136 MMHG | WEIGHT: 293 LBS | BODY MASS INDEX: 56.24 KG/M2 | DIASTOLIC BLOOD PRESSURE: 84 MMHG

## 2019-03-21 DIAGNOSIS — O09.93 HIGH-RISK PREGNANCY IN THIRD TRIMESTER: ICD-10-CM

## 2019-03-21 DIAGNOSIS — O09.299 H/O INCOMPETENT CERVIX, CURRENTLY PREGNANT: ICD-10-CM

## 2019-03-21 DIAGNOSIS — O09.523 ELDERLY MULTIGRAVIDA IN THIRD TRIMESTER: ICD-10-CM

## 2019-03-21 DIAGNOSIS — O09.523 ELDERLY MULTIGRAVIDA IN THIRD TRIMESTER: Primary | ICD-10-CM

## 2019-03-21 DIAGNOSIS — O99.213 OBESITY AFFECTING PREGNANCY IN THIRD TRIMESTER: ICD-10-CM

## 2019-03-21 PROCEDURE — 0502F SUBSEQUENT PRENATAL CARE: CPT | Mod: CPTII,S$GLB,, | Performed by: ADVANCED PRACTICE MIDWIFE

## 2019-03-21 PROCEDURE — 0502F PR SUBSEQUENT PRENATAL CARE: ICD-10-PCS | Mod: CPTII,S$GLB,, | Performed by: ADVANCED PRACTICE MIDWIFE

## 2019-03-21 PROCEDURE — 99999 PR PBB SHADOW E&M-EST. PATIENT-LVL III: CPT | Mod: PBBFAC,,, | Performed by: ADVANCED PRACTICE MIDWIFE

## 2019-03-21 PROCEDURE — 99999 PR PBB SHADOW E&M-EST. PATIENT-LVL III: ICD-10-PCS | Mod: PBBFAC,,, | Performed by: ADVANCED PRACTICE MIDWIFE

## 2019-03-21 PROCEDURE — 76819 PR US, OB, FETAL BIOPHYSICAL, W/O NST: ICD-10-PCS | Mod: S$GLB,,, | Performed by: OBSTETRICS & GYNECOLOGY

## 2019-03-21 PROCEDURE — 76819 FETAL BIOPHYS PROFIL W/O NST: CPT | Mod: S$GLB,,, | Performed by: OBSTETRICS & GYNECOLOGY

## 2019-03-21 NOTE — PROGRESS NOTES
Doing well today. Ready for baby.   BPP 8/8. SHAQUILLE 19.5, MVP 6.6cm.   VE per request. Cervix soft and very anterior.   Reviewed FKC/ROM/Labor precautions. Discussed appt for next week then possible induction.

## 2019-03-26 ENCOUNTER — PATIENT MESSAGE (OUTPATIENT)
Dept: OBSTETRICS AND GYNECOLOGY | Facility: CLINIC | Age: 39
End: 2019-03-26

## 2019-03-27 ENCOUNTER — PROCEDURE VISIT (OUTPATIENT)
Dept: OBSTETRICS AND GYNECOLOGY | Facility: CLINIC | Age: 39
End: 2019-03-27
Payer: COMMERCIAL

## 2019-03-27 ENCOUNTER — HOSPITAL ENCOUNTER (INPATIENT)
Facility: HOSPITAL | Age: 39
LOS: 3 days | Discharge: HOME | End: 2019-03-30
Attending: OBSTETRICS & GYNECOLOGY | Admitting: OBSTETRICS & GYNECOLOGY
Payer: COMMERCIAL

## 2019-03-27 ENCOUNTER — ROUTINE PRENATAL (OUTPATIENT)
Dept: OBSTETRICS AND GYNECOLOGY | Facility: CLINIC | Age: 39
End: 2019-03-27
Payer: COMMERCIAL

## 2019-03-27 ENCOUNTER — ANESTHESIA (OUTPATIENT)
Dept: OBSTETRICS AND GYNECOLOGY | Facility: HOSPITAL | Age: 39
End: 2019-03-27
Payer: COMMERCIAL

## 2019-03-27 ENCOUNTER — ANESTHESIA EVENT (OUTPATIENT)
Dept: OBSTETRICS AND GYNECOLOGY | Facility: HOSPITAL | Age: 39
End: 2019-03-27
Payer: COMMERCIAL

## 2019-03-27 VITALS — WEIGHT: 293 LBS | SYSTOLIC BLOOD PRESSURE: 156 MMHG | DIASTOLIC BLOOD PRESSURE: 94 MMHG | BODY MASS INDEX: 56.24 KG/M2

## 2019-03-27 DIAGNOSIS — O09.523 ELDERLY MULTIGRAVIDA IN THIRD TRIMESTER: Primary | ICD-10-CM

## 2019-03-27 DIAGNOSIS — D62 ACUTE BLOOD LOSS ANEMIA: ICD-10-CM

## 2019-03-27 DIAGNOSIS — O99.019 ANEMIA DURING PREGNANCY: ICD-10-CM

## 2019-03-27 DIAGNOSIS — O14.93 PRE-ECLAMPSIA IN THIRD TRIMESTER: ICD-10-CM

## 2019-03-27 DIAGNOSIS — O99.213 OBESITY AFFECTING PREGNANCY IN THIRD TRIMESTER: ICD-10-CM

## 2019-03-27 DIAGNOSIS — Z98.891 STATUS POST PRIMARY LOW TRANSVERSE CESAREAN SECTION: Primary | ICD-10-CM

## 2019-03-27 DIAGNOSIS — O09.93 HIGH-RISK PREGNANCY IN THIRD TRIMESTER: ICD-10-CM

## 2019-03-27 DIAGNOSIS — O13.3 GESTATIONAL HYPERTENSION, THIRD TRIMESTER: ICD-10-CM

## 2019-03-27 DIAGNOSIS — O09.523 ELDERLY MULTIGRAVIDA IN THIRD TRIMESTER: ICD-10-CM

## 2019-03-27 DIAGNOSIS — O13.9 GESTATIONAL HYPERTENSION: ICD-10-CM

## 2019-03-27 DIAGNOSIS — E87.6 HYPOKALEMIA: ICD-10-CM

## 2019-03-27 PROBLEM — Z34.90 ENCOUNTER FOR ELECTIVE INDUCTION OF LABOR: Status: ACTIVE | Noted: 2019-03-27

## 2019-03-27 PROBLEM — O14.13 PREECLAMPSIA, SEVERE, THIRD TRIMESTER: Status: ACTIVE | Noted: 2019-03-27

## 2019-03-27 LAB
ABO + RH BLD: NORMAL
ALBUMIN SERPL BCP-MCNC: 2.4 G/DL (ref 3.5–5.2)
ALP SERPL-CCNC: 193 U/L (ref 55–135)
ALT SERPL W/O P-5'-P-CCNC: 21 U/L (ref 10–44)
ANION GAP SERPL CALC-SCNC: 9 MMOL/L (ref 8–16)
AST SERPL-CCNC: 27 U/L (ref 10–40)
BASOPHILS # BLD AUTO: 0.02 K/UL (ref 0–0.2)
BASOPHILS NFR BLD: 0.4 % (ref 0–1.9)
BILIRUB SERPL-MCNC: 0.5 MG/DL (ref 0.1–1)
BLD GP AB SCN CELLS X3 SERPL QL: NORMAL
BUN SERPL-MCNC: <2 MG/DL (ref 6–20)
CALCIUM SERPL-MCNC: 8.6 MG/DL (ref 8.7–10.5)
CHLORIDE SERPL-SCNC: 106 MMOL/L (ref 95–110)
CO2 SERPL-SCNC: 22 MMOL/L (ref 23–29)
CREAT SERPL-MCNC: 0.7 MG/DL (ref 0.5–1.4)
CREAT UR-MCNC: 162.3 MG/DL (ref 15–325)
DIFFERENTIAL METHOD: ABNORMAL
EOSINOPHIL # BLD AUTO: 0.1 K/UL (ref 0–0.5)
EOSINOPHIL NFR BLD: 1.1 % (ref 0–8)
ERYTHROCYTE [DISTWIDTH] IN BLOOD BY AUTOMATED COUNT: 13.5 % (ref 11.5–14.5)
EST. GFR  (AFRICAN AMERICAN): >60 ML/MIN/1.73 M^2
EST. GFR  (NON AFRICAN AMERICAN): >60 ML/MIN/1.73 M^2
GLUCOSE SERPL-MCNC: 74 MG/DL (ref 70–110)
HCT VFR BLD AUTO: 34 % (ref 37–48.5)
HGB BLD-MCNC: 11.7 G/DL (ref 12–16)
LYMPHOCYTES # BLD AUTO: 1.5 K/UL (ref 1–4.8)
LYMPHOCYTES NFR BLD: 26.4 % (ref 18–48)
MCH RBC QN AUTO: 28.7 PG (ref 27–31)
MCHC RBC AUTO-ENTMCNC: 34.4 G/DL (ref 32–36)
MCV RBC AUTO: 84 FL (ref 82–98)
MONOCYTES # BLD AUTO: 0.6 K/UL (ref 0.3–1)
MONOCYTES NFR BLD: 10.4 % (ref 4–15)
NEUTROPHILS # BLD AUTO: 3.5 K/UL (ref 1.8–7.7)
NEUTROPHILS NFR BLD: 61.7 % (ref 38–73)
PLATELET # BLD AUTO: 437 K/UL (ref 150–350)
PMV BLD AUTO: 10.1 FL (ref 9.2–12.9)
POTASSIUM SERPL-SCNC: 2.8 MMOL/L (ref 3.5–5.1)
PROT SERPL-MCNC: 6.4 G/DL (ref 6–8.4)
PROT UR-MCNC: 70 MG/DL (ref 0–15)
PROT/CREAT UR: 0.43 MG/G{CREAT} (ref 0–0.2)
RBC # BLD AUTO: 4.07 M/UL (ref 4–5.4)
SODIUM SERPL-SCNC: 137 MMOL/L (ref 136–145)
WBC # BLD AUTO: 5.65 K/UL (ref 3.9–12.7)

## 2019-03-27 PROCEDURE — 82570 ASSAY OF URINE CREATININE: CPT

## 2019-03-27 PROCEDURE — 0502F SUBSEQUENT PRENATAL CARE: CPT | Mod: CPTII,S$GLB,, | Performed by: ADVANCED PRACTICE MIDWIFE

## 2019-03-27 PROCEDURE — 76819 PR US, OB, FETAL BIOPHYSICAL, W/O NST: ICD-10-PCS | Mod: S$GLB,,, | Performed by: OBSTETRICS & GYNECOLOGY

## 2019-03-27 PROCEDURE — 11000001 HC ACUTE MED/SURG PRIVATE ROOM

## 2019-03-27 PROCEDURE — 63600175 PHARM REV CODE 636 W HCPCS: Performed by: ADVANCED PRACTICE MIDWIFE

## 2019-03-27 PROCEDURE — 27800517 HC TRAY,EPIDURAL-CONTINUOUS: Performed by: NURSE ANESTHETIST, CERTIFIED REGISTERED

## 2019-03-27 PROCEDURE — 76816 PR  US,PREGNANT UTERUS,F/U,TRANSABD APP: ICD-10-PCS | Mod: S$GLB,,, | Performed by: OBSTETRICS & GYNECOLOGY

## 2019-03-27 PROCEDURE — 62326 NJX INTERLAMINAR LMBR/SAC: CPT | Performed by: ANESTHESIOLOGY

## 2019-03-27 PROCEDURE — 76819 FETAL BIOPHYS PROFIL W/O NST: CPT | Mod: S$GLB,,, | Performed by: OBSTETRICS & GYNECOLOGY

## 2019-03-27 PROCEDURE — 86850 RBC ANTIBODY SCREEN: CPT

## 2019-03-27 PROCEDURE — 72100002 HC LABOR CARE, 1ST 8 HOURS

## 2019-03-27 PROCEDURE — 99999 PR PBB SHADOW E&M-EST. PATIENT-LVL II: CPT | Mod: PBBFAC,,, | Performed by: ADVANCED PRACTICE MIDWIFE

## 2019-03-27 PROCEDURE — 27000181 HC CABLE, IUPC

## 2019-03-27 PROCEDURE — 85025 COMPLETE CBC W/AUTO DIFF WBC: CPT

## 2019-03-27 PROCEDURE — 0502F PR SUBSEQUENT PRENATAL CARE: ICD-10-PCS | Mod: CPTII,S$GLB,, | Performed by: ADVANCED PRACTICE MIDWIFE

## 2019-03-27 PROCEDURE — 76816 OB US FOLLOW-UP PER FETUS: CPT | Mod: S$GLB,,, | Performed by: OBSTETRICS & GYNECOLOGY

## 2019-03-27 PROCEDURE — 72100003 HC LABOR CARE, EA. ADDL. 8 HRS

## 2019-03-27 PROCEDURE — 25000003 PHARM REV CODE 250: Performed by: ADVANCED PRACTICE MIDWIFE

## 2019-03-27 PROCEDURE — 63600175 PHARM REV CODE 636 W HCPCS: Performed by: NURSE ANESTHETIST, CERTIFIED REGISTERED

## 2019-03-27 PROCEDURE — 80053 COMPREHEN METABOLIC PANEL: CPT

## 2019-03-27 PROCEDURE — 99999 PR PBB SHADOW E&M-EST. PATIENT-LVL II: ICD-10-PCS | Mod: PBBFAC,,, | Performed by: ADVANCED PRACTICE MIDWIFE

## 2019-03-27 PROCEDURE — 25000003 PHARM REV CODE 250: Performed by: NURSE ANESTHETIST, CERTIFIED REGISTERED

## 2019-03-27 PROCEDURE — 51701 INSERT BLADDER CATHETER: CPT

## 2019-03-27 PROCEDURE — 51702 INSERT TEMP BLADDER CATH: CPT

## 2019-03-27 RX ORDER — SODIUM CHLORIDE 9 MG/ML
INJECTION, SOLUTION INTRAVENOUS
Status: DISCONTINUED | OUTPATIENT
Start: 2019-03-27 | End: 2019-03-28

## 2019-03-27 RX ORDER — OXYTOCIN/RINGER'S LACTATE 20/1000 ML
2 PLASTIC BAG, INJECTION (ML) INTRAVENOUS CONTINUOUS
Status: DISCONTINUED | OUTPATIENT
Start: 2019-03-27 | End: 2019-03-28

## 2019-03-27 RX ORDER — SODIUM CHLORIDE, SODIUM LACTATE, POTASSIUM CHLORIDE, CALCIUM CHLORIDE 600; 310; 30; 20 MG/100ML; MG/100ML; MG/100ML; MG/100ML
INJECTION, SOLUTION INTRAVENOUS CONTINUOUS
Status: DISCONTINUED | OUTPATIENT
Start: 2019-03-27 | End: 2019-03-28

## 2019-03-27 RX ORDER — ROPIVACAINE HYDROCHLORIDE 5 MG/ML
INJECTION, SOLUTION EPIDURAL; INFILTRATION; PERINEURAL
Status: DISCONTINUED | OUTPATIENT
Start: 2019-03-27 | End: 2019-03-28

## 2019-03-27 RX ORDER — ROPIVACAINE HYDROCHLORIDE 2 MG/ML
INJECTION, SOLUTION EPIDURAL; INFILTRATION; PERINEURAL
Status: DISCONTINUED | OUTPATIENT
Start: 2019-03-27 | End: 2019-03-28

## 2019-03-27 RX ORDER — ROPIVACAINE HYDROCHLORIDE 2 MG/ML
INJECTION, SOLUTION EPIDURAL; INFILTRATION; PERINEURAL CONTINUOUS PRN
Status: DISCONTINUED | OUTPATIENT
Start: 2019-03-27 | End: 2019-03-28

## 2019-03-27 RX ORDER — OXYTOCIN/RINGER'S LACTATE 20/1000 ML
2 PLASTIC BAG, INJECTION (ML) INTRAVENOUS CONTINUOUS
Status: DISCONTINUED | OUTPATIENT
Start: 2019-03-27 | End: 2019-03-27

## 2019-03-27 RX ORDER — ACETAMINOPHEN 500 MG
1000 TABLET ORAL ONCE
Status: COMPLETED | OUTPATIENT
Start: 2019-03-27 | End: 2019-03-27

## 2019-03-27 RX ORDER — ONDANSETRON 8 MG/1
8 TABLET, ORALLY DISINTEGRATING ORAL EVERY 8 HOURS PRN
Status: DISCONTINUED | OUTPATIENT
Start: 2019-03-27 | End: 2019-03-28

## 2019-03-27 RX ORDER — FENTANYL CITRATE 50 UG/ML
INJECTION, SOLUTION INTRAMUSCULAR; INTRAVENOUS
Status: DISCONTINUED | OUTPATIENT
Start: 2019-03-27 | End: 2019-03-28

## 2019-03-27 RX ORDER — LIDOCAINE HYDROCHLORIDE AND EPINEPHRINE 15; 5 MG/ML; UG/ML
INJECTION, SOLUTION EPIDURAL
Status: DISCONTINUED | OUTPATIENT
Start: 2019-03-27 | End: 2019-03-28

## 2019-03-27 RX ORDER — OXYTOCIN/RINGER'S LACTATE 20/1000 ML
333 PLASTIC BAG, INJECTION (ML) INTRAVENOUS CONTINUOUS
Status: ACTIVE | OUTPATIENT
Start: 2019-03-27 | End: 2019-03-27

## 2019-03-27 RX ORDER — OXYTOCIN/RINGER'S LACTATE 20/1000 ML
41.7 PLASTIC BAG, INJECTION (ML) INTRAVENOUS CONTINUOUS
Status: ACTIVE | OUTPATIENT
Start: 2019-03-27 | End: 2019-03-27

## 2019-03-27 RX ADMIN — DEXTROSE, SODIUM CHLORIDE, SODIUM LACTATE, POTASSIUM CHLORIDE, AND CALCIUM CHLORIDE 500 ML: 5; .6; .31; .03; .02 INJECTION, SOLUTION INTRAVENOUS at 08:03

## 2019-03-27 RX ADMIN — Medication 2 MILLI-UNITS/MIN: at 01:03

## 2019-03-27 RX ADMIN — ROPIVACAINE HYDROCHLORIDE 10 ML/HR: 2 INJECTION, SOLUTION EPIDURAL; INFILTRATION at 05:03

## 2019-03-27 RX ADMIN — ACETAMINOPHEN 1000 MG: 500 TABLET ORAL at 06:03

## 2019-03-27 RX ADMIN — ROPIVACAINE HYDROCHLORIDE 3 ML: 2 INJECTION, SOLUTION EPIDURAL; INFILTRATION at 05:03

## 2019-03-27 RX ADMIN — FENTANYL CITRATE 100 MCG: 50 INJECTION, SOLUTION INTRAMUSCULAR; INTRAVENOUS at 11:03

## 2019-03-27 RX ADMIN — SODIUM CHLORIDE, SODIUM LACTATE, POTASSIUM CHLORIDE, AND CALCIUM CHLORIDE 1000 ML: .6; .31; .03; .02 INJECTION, SOLUTION INTRAVENOUS at 04:03

## 2019-03-27 RX ADMIN — ROPIVACAINE HYDROCHLORIDE 8 ML: 2 INJECTION, SOLUTION EPIDURAL; INFILTRATION at 05:03

## 2019-03-27 RX ADMIN — LIDOCAINE HYDROCHLORIDE,EPINEPHRINE BITARTRATE 5 ML: 15; .005 INJECTION, SOLUTION EPIDURAL; INFILTRATION; INTRACAUDAL; PERINEURAL at 08:03

## 2019-03-27 RX ADMIN — LIDOCAINE HYDROCHLORIDE,EPINEPHRINE BITARTRATE 3 ML: 15; .005 INJECTION, SOLUTION EPIDURAL; INFILTRATION; INTRACAUDAL; PERINEURAL at 05:03

## 2019-03-27 RX ADMIN — ROPIVACAINE HYDROCHLORIDE 10 ML/HR: 2 INJECTION, SOLUTION EPIDURAL; INFILTRATION at 11:03

## 2019-03-27 RX ADMIN — ROPIVACAINE HYDROCHLORIDE 5 ML: 5 INJECTION, SOLUTION EPIDURAL; INFILTRATION; PERINEURAL at 11:03

## 2019-03-27 RX ADMIN — SODIUM CHLORIDE, SODIUM LACTATE, POTASSIUM CHLORIDE, AND CALCIUM CHLORIDE: .6; .31; .03; .02 INJECTION, SOLUTION INTRAVENOUS at 06:03

## 2019-03-27 RX ADMIN — SODIUM CHLORIDE, SODIUM LACTATE, POTASSIUM CHLORIDE, AND CALCIUM CHLORIDE: .6; .31; .03; .02 INJECTION, SOLUTION INTRAVENOUS at 12:03

## 2019-03-27 NOTE — ANESTHESIA PROCEDURE NOTES
Epidural    Patient location during procedure: OB   Reason for block: primary anesthetic   Diagnosis: na   Start time: 3/27/2019 5:23 PM  Timeout: 3/27/2019 5:20 PM  End time: 3/27/2019 5:29 PM  Staffing  Anesthesiologist: Danni Hawkins MD  Performed: anesthesiologist   Preanesthetic Checklist  Completed: patient identified, pre-op evaluation, timeout performed, IV checked, risks and benefits discussed, monitors and equipment checked, anesthesia consent given, hand hygiene performed and patient being monitored  Preparation  Patient position: sitting  Prep: Betadine  Patient monitoring: Pulse Ox and Blood Pressure  Epidural  Skin Anesthetic: lidocaine 1%  Skin Wheal: 3 mL  Administration type: continuous  Approach: midline  Interspace: L3-4    Injection technique: EVON air and EVON saline  Needle and Epidural Catheter  Needle type: Tuohy   Needle gauge: 17  Needle length: 3.5 inches  Needle insertion depth: 9 cm  Catheter type: end hole  Catheter size: 19 G  Catheter at skin depth: 15 cm  Test dose: 3 mL of lidocaine 1.5% with Epi 1-to-200,000  Additional Documentation: incremental injection, no paresthesia on injection, no significant pain on injection, no significant complaints from patient and no signs/symptoms of IV or SA injection  Needle localization: anatomical landmarks  Assessment  Upper dermatomal levels - Left: T8  Right: T8  Ease of block: moderate  Patient's tolerance of the procedure: comfortable throughout block  Additional Notes  Epidural bolus - 0.2% Ropivacaine 12 ml No inadvertent dural puncture with Tuohy.  Dural puncture not performed with spinal needle

## 2019-03-27 NOTE — PROGRESS NOTES
Doing well today. Denies LOF or VB. Occasional headaches. Had one episode of blurred vision.   BPP 8/8, SHAQUILLE 8.3, EFW 7#9oz.   VE shows cervix very anterior and soft. Reviewed FKC/ROM/Labor precautions.   Recheck BP was 164/94 then 151/100. Sent to hospital for evaluation and IOL.

## 2019-03-27 NOTE — PLAN OF CARE
Problem: Adult Inpatient Plan of Care  Goal: Plan of Care Review  Induction for PIH. Pitocin infusing. Epidural for pain management. Family at bedside. Patient excited for delivery.

## 2019-03-27 NOTE — PROGRESS NOTES
S:Doing well  O:  VS reviewed, afebrile   Vitals:    03/27/19 1432 03/27/19 1447 03/27/19 1502 03/27/19 1517   BP: (!) 156/85 (!) 158/94 (!) 151/76 (!) 152/75   Pulse: 86 88 74 80   Temp:       TempSrc:           FHTs: 130 cat 1 reassuring   UC: q 2-3  SVE: 3/80/-2       A: IUP @ 39w6d ;     Patient Active Problem List   Diagnosis    High-risk pregnancy in third trimester    H/O incompetent cervix, currently pregnant    Incompetent cervix during second trimester, antepartum    Obesity affecting pregnancy in third trimester    Elderly multigravida in third trimester    Thrombocytosis    Encounter for elective induction of labor    Gestational hypertension, third trimester    Gestational hypertension    Pre-eclampsia in third trimester       P: Continue Pitocin   NATHAN prn   Continue close monitoring of maternal/fetal status  Anticipate vaginal delivery

## 2019-03-27 NOTE — ANESTHESIA PREPROCEDURE EVALUATION
03/27/2019  Favian Hernandes is a 38 y.o., female.    Pre-op Assessment    I have reviewed the Patient Summary Reports.     I have reviewed the Nursing Notes.   I have reviewed the Medications.     Review of Systems  Anesthesia Hx:  No problems with previous Anesthesia  History of prior surgery of interest to airway management or planning: Previous anesthesia: General, Epidural Denies Family Hx of Anesthesia complications.   Denies Personal Hx of Anesthesia complications.   Social:  No Alcohol Use, Non-Smoker    Hematology/Oncology:     Oncology Normal     EENT/Dental:EENT/Dental Normal   Cardiovascular:   Hypertension    Pulmonary:  Pulmonary Normal    Renal/:  Renal/ Normal     Hepatic/GI:  Hepatic/GI Normal    Musculoskeletal:  Musculoskeletal Normal    OB/GYN/PEDS:  PIH induction for PreE with Pit    Previous fetal demise  Incompetent cervix   Neurological:  Neurology Normal    Endocrine:  Endocrine Normal    Dermatological:  Skin Normal    Psych:  Psychiatric Normal           Physical Exam  General:  Morbid Obesity    Airway/Jaw/Neck:  Airway Findings: Mouth Opening: Normal Tongue: Normal  General Airway Assessment: Adult  Mallampati: III  Improves to II with phonation.  TM Distance: Normal, at least 6 cm       Chest/Lungs:  Chest/Lungs Findings: Normal Respiratory Rate     Heart/Vascular:  Heart Findings: Rate: Normal        Mental Status:  Mental Status Findings:  Alert and Oriented, Cooperative         Anesthesia Plan  Type of Anesthesia, risks & benefits discussed:  Anesthesia Type:  epidural  Patient's Preference:   Intra-op Monitoring Plan: standard ASA monitors  Intra-op Monitoring Plan Comments:   Post Op Pain Control Plan:   Post Op Pain Control Plan Comments:   Induction:    Beta Blocker:         Informed Consent: Patient understands risks and agrees with Anesthesia plan.  Questions  answered. Anesthesia consent signed with patient.  ASA Score: 2     Day of Surgery Review of History & Physical:  There are no significant changes.

## 2019-03-28 PROBLEM — Z98.891 STATUS POST PRIMARY LOW TRANSVERSE CESAREAN SECTION: Status: ACTIVE | Noted: 2019-03-28

## 2019-03-28 PROBLEM — Z34.90 ENCOUNTER FOR ELECTIVE INDUCTION OF LABOR: Status: RESOLVED | Noted: 2019-03-27 | Resolved: 2019-03-28

## 2019-03-28 PROBLEM — E87.6 HYPOKALEMIA: Status: ACTIVE | Noted: 2019-03-28

## 2019-03-28 PROBLEM — O13.9 GESTATIONAL HYPERTENSION: Status: RESOLVED | Noted: 2019-03-27 | Resolved: 2019-03-28

## 2019-03-28 PROBLEM — O13.3 GESTATIONAL HYPERTENSION, THIRD TRIMESTER: Status: RESOLVED | Noted: 2019-03-27 | Resolved: 2019-03-28

## 2019-03-28 PROBLEM — O33.4XX0 CEPHALOPELVIC DISPROPORTION DUE TO MIXED MATERNAL AND FETAL FACTORS: Status: ACTIVE | Noted: 2019-03-28

## 2019-03-28 LAB
ANION GAP SERPL CALC-SCNC: 11 MMOL/L (ref 8–16)
BASOPHILS # BLD AUTO: 0.01 K/UL (ref 0–0.2)
BASOPHILS NFR BLD: 0.1 % (ref 0–1.9)
BUN SERPL-MCNC: 4 MG/DL (ref 6–20)
CALCIUM SERPL-MCNC: 8.3 MG/DL (ref 8.7–10.5)
CHLORIDE SERPL-SCNC: 103 MMOL/L (ref 95–110)
CO2 SERPL-SCNC: 21 MMOL/L (ref 23–29)
CREAT SERPL-MCNC: 0.8 MG/DL (ref 0.5–1.4)
DIFFERENTIAL METHOD: ABNORMAL
EOSINOPHIL # BLD AUTO: 0 K/UL (ref 0–0.5)
EOSINOPHIL NFR BLD: 0 % (ref 0–8)
ERYTHROCYTE [DISTWIDTH] IN BLOOD BY AUTOMATED COUNT: 13.6 % (ref 11.5–14.5)
EST. GFR  (AFRICAN AMERICAN): >60 ML/MIN/1.73 M^2
EST. GFR  (NON AFRICAN AMERICAN): >60 ML/MIN/1.73 M^2
GLUCOSE SERPL-MCNC: 117 MG/DL (ref 70–110)
HCT VFR BLD AUTO: 29.8 % (ref 37–48.5)
HGB BLD-MCNC: 10.1 G/DL (ref 12–16)
LYMPHOCYTES # BLD AUTO: 1.3 K/UL (ref 1–4.8)
LYMPHOCYTES NFR BLD: 7.4 % (ref 18–48)
MCH RBC QN AUTO: 28.5 PG (ref 27–31)
MCHC RBC AUTO-ENTMCNC: 33.9 G/DL (ref 32–36)
MCV RBC AUTO: 84 FL (ref 82–98)
MONOCYTES # BLD AUTO: 1.8 K/UL (ref 0.3–1)
MONOCYTES NFR BLD: 10.4 % (ref 4–15)
NEUTROPHILS # BLD AUTO: 14.3 K/UL (ref 1.8–7.7)
NEUTROPHILS NFR BLD: 82.1 % (ref 38–73)
PLATELET # BLD AUTO: 403 K/UL (ref 150–350)
PMV BLD AUTO: 10.1 FL (ref 9.2–12.9)
POTASSIUM SERPL-SCNC: 3.2 MMOL/L (ref 3.5–5.1)
RBC # BLD AUTO: 3.54 M/UL (ref 4–5.4)
SODIUM SERPL-SCNC: 135 MMOL/L (ref 136–145)
WBC # BLD AUTO: 17.35 K/UL (ref 3.9–12.7)

## 2019-03-28 PROCEDURE — 25000003 PHARM REV CODE 250: Performed by: OBSTETRICS & GYNECOLOGY

## 2019-03-28 PROCEDURE — 63600175 PHARM REV CODE 636 W HCPCS: Performed by: OBSTETRICS & GYNECOLOGY

## 2019-03-28 PROCEDURE — 51702 INSERT TEMP BLADDER CATH: CPT

## 2019-03-28 PROCEDURE — 27000181 HC CABLE, IUPC

## 2019-03-28 PROCEDURE — 72100002 HC LABOR CARE, 1ST 8 HOURS

## 2019-03-28 PROCEDURE — 85025 COMPLETE CBC W/AUTO DIFF WBC: CPT

## 2019-03-28 PROCEDURE — 72100003 HC LABOR CARE, EA. ADDL. 8 HRS

## 2019-03-28 PROCEDURE — 59514 CESAREAN DELIVERY ONLY: CPT | Mod: AS,GB,, | Performed by: PHYSICIAN ASSISTANT

## 2019-03-28 PROCEDURE — 63600175 PHARM REV CODE 636 W HCPCS: Performed by: NURSE ANESTHETIST, CERTIFIED REGISTERED

## 2019-03-28 PROCEDURE — 11000001 HC ACUTE MED/SURG PRIVATE ROOM

## 2019-03-28 PROCEDURE — 59510 CESAREAN DELIVERY: CPT | Mod: GB,,, | Performed by: OBSTETRICS & GYNECOLOGY

## 2019-03-28 PROCEDURE — 36000684 HC CESAREAN SECTION, UNSCHEDULED

## 2019-03-28 PROCEDURE — 80048 BASIC METABOLIC PNL TOTAL CA: CPT

## 2019-03-28 PROCEDURE — 37000008 HC ANESTHESIA 1ST 15 MINUTES: Performed by: OBSTETRICS & GYNECOLOGY

## 2019-03-28 PROCEDURE — 59514 PR CESAREAN DELIVERY ONLY: ICD-10-PCS | Mod: AS,GB,, | Performed by: PHYSICIAN ASSISTANT

## 2019-03-28 PROCEDURE — 36415 COLL VENOUS BLD VENIPUNCTURE: CPT

## 2019-03-28 PROCEDURE — 37000009 HC ANESTHESIA EA ADD 15 MINS: Performed by: OBSTETRICS & GYNECOLOGY

## 2019-03-28 PROCEDURE — 25000003 PHARM REV CODE 250: Performed by: NURSE ANESTHETIST, CERTIFIED REGISTERED

## 2019-03-28 PROCEDURE — 59510 PR FULL ROUT OBSTE CARE,CESAREAN DELIV: ICD-10-PCS | Mod: GB,,, | Performed by: OBSTETRICS & GYNECOLOGY

## 2019-03-28 PROCEDURE — 25000003 PHARM REV CODE 250: Performed by: ADVANCED PRACTICE MIDWIFE

## 2019-03-28 PROCEDURE — 63600175 PHARM REV CODE 636 W HCPCS: Performed by: ADVANCED PRACTICE MIDWIFE

## 2019-03-28 RX ORDER — IBUPROFEN 800 MG/1
800 TABLET ORAL EVERY 8 HOURS
Status: DISCONTINUED | OUTPATIENT
Start: 2019-03-29 | End: 2019-03-30 | Stop reason: HOSPADM

## 2019-03-28 RX ORDER — CHLORHEXIDINE GLUCONATE ORAL RINSE 1.2 MG/ML
10 SOLUTION DENTAL
Status: DISCONTINUED | OUTPATIENT
Start: 2019-03-28 | End: 2019-03-28

## 2019-03-28 RX ORDER — MISOPROSTOL 200 UG/1
800 TABLET ORAL
Status: DISCONTINUED | OUTPATIENT
Start: 2019-03-28 | End: 2019-03-28

## 2019-03-28 RX ORDER — SODIUM CHLORIDE, SODIUM LACTATE, POTASSIUM CHLORIDE, CALCIUM CHLORIDE 600; 310; 30; 20 MG/100ML; MG/100ML; MG/100ML; MG/100ML
INJECTION, SOLUTION INTRAVENOUS CONTINUOUS PRN
Status: DISCONTINUED | OUTPATIENT
Start: 2019-03-28 | End: 2019-03-28

## 2019-03-28 RX ORDER — DIPHENHYDRAMINE HCL 25 MG
25 CAPSULE ORAL EVERY 4 HOURS PRN
Status: DISCONTINUED | OUTPATIENT
Start: 2019-03-28 | End: 2019-03-30 | Stop reason: HOSPADM

## 2019-03-28 RX ORDER — SODIUM CHLORIDE, SODIUM LACTATE, POTASSIUM CHLORIDE, CALCIUM CHLORIDE 600; 310; 30; 20 MG/100ML; MG/100ML; MG/100ML; MG/100ML
INJECTION, SOLUTION INTRAVENOUS CONTINUOUS
Status: DISCONTINUED | OUTPATIENT
Start: 2019-03-28 | End: 2019-03-30 | Stop reason: HOSPADM

## 2019-03-28 RX ORDER — AMMONIA 15 % (W/V)
0.3 AMPUL (EA) INHALATION CONTINUOUS PRN
Status: DISCONTINUED | OUTPATIENT
Start: 2019-03-28 | End: 2019-03-30 | Stop reason: HOSPADM

## 2019-03-28 RX ORDER — DIPHENHYDRAMINE HYDROCHLORIDE 50 MG/ML
25 INJECTION INTRAMUSCULAR; INTRAVENOUS EVERY 4 HOURS PRN
Status: DISCONTINUED | OUTPATIENT
Start: 2019-03-28 | End: 2019-03-28 | Stop reason: RX

## 2019-03-28 RX ORDER — NIFEDIPINE 30 MG/1
30 TABLET, EXTENDED RELEASE ORAL DAILY
Status: DISCONTINUED | OUTPATIENT
Start: 2019-03-28 | End: 2019-03-30 | Stop reason: HOSPADM

## 2019-03-28 RX ORDER — HYDROCODONE BITARTRATE AND ACETAMINOPHEN 5; 325 MG/1; MG/1
1 TABLET ORAL EVERY 4 HOURS PRN
Status: DISCONTINUED | OUTPATIENT
Start: 2019-03-28 | End: 2019-03-30 | Stop reason: HOSPADM

## 2019-03-28 RX ORDER — OXYTOCIN/RINGER'S LACTATE 20/1000 ML
41.65 PLASTIC BAG, INJECTION (ML) INTRAVENOUS CONTINUOUS
Status: DISPENSED | OUTPATIENT
Start: 2019-03-28 | End: 2019-03-28

## 2019-03-28 RX ORDER — LIDOCAINE HCL/EPINEPHRINE/PF 2%-1:200K
VIAL (ML) INJECTION
Status: DISCONTINUED | OUTPATIENT
Start: 2019-03-28 | End: 2019-03-28

## 2019-03-28 RX ORDER — DIPHENHYDRAMINE HYDROCHLORIDE 50 MG/ML
12.5 INJECTION INTRAMUSCULAR; INTRAVENOUS ONCE
Status: COMPLETED | OUTPATIENT
Start: 2019-03-28 | End: 2019-03-28

## 2019-03-28 RX ORDER — SODIUM CHLORIDE, SODIUM LACTATE, POTASSIUM CHLORIDE, CALCIUM CHLORIDE 600; 310; 30; 20 MG/100ML; MG/100ML; MG/100ML; MG/100ML
INJECTION, SOLUTION INTRAVENOUS CONTINUOUS
Status: DISCONTINUED | OUTPATIENT
Start: 2019-03-28 | End: 2019-03-28 | Stop reason: SDUPTHER

## 2019-03-28 RX ORDER — ONDANSETRON 8 MG/1
8 TABLET, ORALLY DISINTEGRATING ORAL EVERY 8 HOURS PRN
Status: DISCONTINUED | OUTPATIENT
Start: 2019-03-28 | End: 2019-03-30 | Stop reason: HOSPADM

## 2019-03-28 RX ORDER — POTASSIUM CHLORIDE 20 MEQ/15ML
25 SOLUTION ORAL 2 TIMES DAILY
Status: DISCONTINUED | OUTPATIENT
Start: 2019-03-28 | End: 2019-03-30 | Stop reason: HOSPADM

## 2019-03-28 RX ORDER — OXYTOCIN/RINGER'S LACTATE 20/1000 ML
41.7 PLASTIC BAG, INJECTION (ML) INTRAVENOUS CONTINUOUS
Status: DISCONTINUED | OUTPATIENT
Start: 2019-03-28 | End: 2019-03-28

## 2019-03-28 RX ORDER — POTASSIUM CHLORIDE 7.46 G/1000ML
10 INJECTION, SOLUTION INTRAVENOUS
Status: DISCONTINUED | OUTPATIENT
Start: 2019-03-28 | End: 2019-03-28 | Stop reason: SDUPTHER

## 2019-03-28 RX ORDER — MISOPROSTOL 200 UG/1
600 TABLET ORAL
Status: DISCONTINUED | OUTPATIENT
Start: 2019-03-28 | End: 2019-03-30 | Stop reason: HOSPADM

## 2019-03-28 RX ORDER — DEXTROSE, SODIUM CHLORIDE, SODIUM LACTATE, POTASSIUM CHLORIDE, AND CALCIUM CHLORIDE 5; .6; .31; .03; .02 G/100ML; G/100ML; G/100ML; G/100ML; G/100ML
INJECTION, SOLUTION INTRAVENOUS CONTINUOUS
Status: DISCONTINUED | OUTPATIENT
Start: 2019-03-28 | End: 2019-03-28

## 2019-03-28 RX ORDER — DOCUSATE SODIUM 100 MG/1
100 CAPSULE, LIQUID FILLED ORAL DAILY
Status: DISCONTINUED | OUTPATIENT
Start: 2019-03-28 | End: 2019-03-30 | Stop reason: HOSPADM

## 2019-03-28 RX ORDER — LIDOCAINE HYDROCHLORIDE 20 MG/ML
INJECTION, SOLUTION EPIDURAL; INFILTRATION; INTRACAUDAL; PERINEURAL
Status: DISCONTINUED | OUTPATIENT
Start: 2019-03-28 | End: 2019-03-28

## 2019-03-28 RX ORDER — ACETAMINOPHEN 325 MG/1
650 TABLET ORAL EVERY 6 HOURS PRN
Status: DISCONTINUED | OUTPATIENT
Start: 2019-03-28 | End: 2019-03-30 | Stop reason: HOSPADM

## 2019-03-28 RX ORDER — PROPOFOL 10 MG/ML
VIAL (ML) INTRAVENOUS
Status: DISCONTINUED | OUTPATIENT
Start: 2019-03-28 | End: 2019-03-28

## 2019-03-28 RX ORDER — SODIUM CITRATE AND CITRIC ACID MONOHYDRATE 334; 500 MG/5ML; MG/5ML
30 SOLUTION ORAL
Status: DISCONTINUED | OUTPATIENT
Start: 2019-03-28 | End: 2019-03-28

## 2019-03-28 RX ORDER — MORPHINE SULFATE 1 MG/ML
INJECTION, SOLUTION EPIDURAL; INTRATHECAL; INTRAVENOUS
Status: DISCONTINUED | OUTPATIENT
Start: 2019-03-28 | End: 2019-03-28

## 2019-03-28 RX ORDER — ONDANSETRON HYDROCHLORIDE 2 MG/ML
INJECTION, SOLUTION INTRAMUSCULAR; INTRAVENOUS
Status: DISCONTINUED | OUTPATIENT
Start: 2019-03-28 | End: 2019-03-28

## 2019-03-28 RX ORDER — POTASSIUM CHLORIDE 7.45 MG/ML
10 INJECTION INTRAVENOUS
Status: COMPLETED | OUTPATIENT
Start: 2019-03-28 | End: 2019-03-28

## 2019-03-28 RX ORDER — POTASSIUM CHLORIDE 20 MEQ/1
20 TABLET, EXTENDED RELEASE ORAL 2 TIMES DAILY
Status: DISCONTINUED | OUTPATIENT
Start: 2019-03-28 | End: 2019-03-28

## 2019-03-28 RX ORDER — OXYTOCIN/RINGER'S LACTATE 20/1000 ML
PLASTIC BAG, INJECTION (ML) INTRAVENOUS CONTINUOUS PRN
Status: DISCONTINUED | OUTPATIENT
Start: 2019-03-28 | End: 2019-03-28

## 2019-03-28 RX ORDER — OXYTOCIN/RINGER'S LACTATE 20/1000 ML
333 PLASTIC BAG, INJECTION (ML) INTRAVENOUS CONTINUOUS
Status: DISCONTINUED | OUTPATIENT
Start: 2019-03-28 | End: 2019-03-28

## 2019-03-28 RX ORDER — KETOROLAC TROMETHAMINE 30 MG/ML
30 INJECTION, SOLUTION INTRAMUSCULAR; INTRAVENOUS EVERY 6 HOURS
Status: DISPENSED | OUTPATIENT
Start: 2019-03-28 | End: 2019-03-29

## 2019-03-28 RX ORDER — HYDROMORPHONE HYDROCHLORIDE 1 MG/ML
1 INJECTION, SOLUTION INTRAMUSCULAR; INTRAVENOUS; SUBCUTANEOUS EVERY 4 HOURS PRN
Status: DISCONTINUED | OUTPATIENT
Start: 2019-03-28 | End: 2019-03-30 | Stop reason: HOSPADM

## 2019-03-28 RX ORDER — KETOROLAC TROMETHAMINE 30 MG/ML
INJECTION, SOLUTION INTRAMUSCULAR; INTRAVENOUS
Status: DISCONTINUED | OUTPATIENT
Start: 2019-03-28 | End: 2019-03-28

## 2019-03-28 RX ORDER — HYDROCODONE BITARTRATE AND ACETAMINOPHEN 10; 325 MG/1; MG/1
1 TABLET ORAL EVERY 4 HOURS PRN
Status: DISCONTINUED | OUTPATIENT
Start: 2019-03-28 | End: 2019-03-30 | Stop reason: HOSPADM

## 2019-03-28 RX ORDER — CEFAZOLIN SODIUM 1 G/3ML
INJECTION, POWDER, FOR SOLUTION INTRAMUSCULAR; INTRAVENOUS
Status: DISCONTINUED | OUTPATIENT
Start: 2019-03-28 | End: 2019-03-28

## 2019-03-28 RX ADMIN — POTASSIUM CHLORIDE 10 MEQ: 7.46 INJECTION, SOLUTION INTRAVENOUS at 12:03

## 2019-03-28 RX ADMIN — SODIUM CHLORIDE, SODIUM LACTATE, POTASSIUM CHLORIDE, AND CALCIUM CHLORIDE 1000 ML: .6; .31; .03; .02 INJECTION, SOLUTION INTRAVENOUS at 09:03

## 2019-03-28 RX ADMIN — FENTANYL CITRATE 100 MCG: 50 INJECTION, SOLUTION INTRAMUSCULAR; INTRAVENOUS at 07:03

## 2019-03-28 RX ADMIN — PROPOFOL 50 MG: 10 INJECTION, EMULSION INTRAVENOUS at 10:03

## 2019-03-28 RX ADMIN — NIFEDIPINE 30 MG: 30 TABLET, FILM COATED, EXTENDED RELEASE ORAL at 05:03

## 2019-03-28 RX ADMIN — KETOROLAC TROMETHAMINE 30 MG: 30 INJECTION, SOLUTION INTRAMUSCULAR; INTRAVENOUS at 05:03

## 2019-03-28 RX ADMIN — POTASSIUM CHLORIDE 10 MEQ: 7.46 INJECTION, SOLUTION INTRAVENOUS at 02:03

## 2019-03-28 RX ADMIN — PROPOFOL 50 MG: 10 INJECTION, EMULSION INTRAVENOUS at 11:03

## 2019-03-28 RX ADMIN — DIPHENHYDRAMINE HYDROCHLORIDE 12.5 MG: 50 INJECTION INTRAMUSCULAR; INTRAVENOUS at 05:03

## 2019-03-28 RX ADMIN — Medication: at 10:03

## 2019-03-28 RX ADMIN — Medication 2 MILLI-UNITS/MIN: at 02:03

## 2019-03-28 RX ADMIN — ONDANSETRON 4 MG: 2 INJECTION, SOLUTION INTRAMUSCULAR; INTRAVENOUS at 10:03

## 2019-03-28 RX ADMIN — LIDOCAINE HYDROCHLORIDE,EPINEPHRINE BITARTRATE 5 ML: 20; .005 INJECTION, SOLUTION EPIDURAL; INFILTRATION; INTRACAUDAL; PERINEURAL at 09:03

## 2019-03-28 RX ADMIN — CEFAZOLIN 3 G: 1 INJECTION, POWDER, FOR SOLUTION INTRAMUSCULAR; INTRAVENOUS at 10:03

## 2019-03-28 RX ADMIN — POTASSIUM CHLORIDE 10 MEQ: 7.46 INJECTION, SOLUTION INTRAVENOUS at 01:03

## 2019-03-28 RX ADMIN — Medication: at 11:03

## 2019-03-28 RX ADMIN — AZITHROMYCIN MONOHYDRATE 500 MG: 500 INJECTION, POWDER, LYOPHILIZED, FOR SOLUTION INTRAVENOUS at 09:03

## 2019-03-28 RX ADMIN — LIDOCAINE HYDROCHLORIDE 40 MG: 20 INJECTION, SOLUTION EPIDURAL; INFILTRATION; INTRACAUDAL; PERINEURAL at 10:03

## 2019-03-28 RX ADMIN — POTASSIUM CHLORIDE 25.07 MEQ: 20 SOLUTION ORAL at 09:03

## 2019-03-28 RX ADMIN — FENTANYL CITRATE 100 MCG: 50 INJECTION, SOLUTION INTRAMUSCULAR; INTRAVENOUS at 02:03

## 2019-03-28 RX ADMIN — SODIUM CHLORIDE, SODIUM LACTATE, POTASSIUM CHLORIDE, AND CALCIUM CHLORIDE: .6; .31; .03; .02 INJECTION, SOLUTION INTRAVENOUS at 09:03

## 2019-03-28 RX ADMIN — ROPIVACAINE HYDROCHLORIDE 5 ML: 5 INJECTION, SOLUTION EPIDURAL; INFILTRATION; PERINEURAL at 02:03

## 2019-03-28 RX ADMIN — MORPHINE SULFATE 3 MG: 1 INJECTION, SOLUTION EPIDURAL; INTRATHECAL; INTRAVENOUS at 10:03

## 2019-03-28 RX ADMIN — LIDOCAINE HYDROCHLORIDE,EPINEPHRINE BITARTRATE 5 ML: 20; .005 INJECTION, SOLUTION EPIDURAL; INFILTRATION; INTRACAUDAL; PERINEURAL at 05:03

## 2019-03-28 RX ADMIN — POTASSIUM CHLORIDE 10 MEQ: 7.46 INJECTION, SOLUTION INTRAVENOUS at 10:03

## 2019-03-28 RX ADMIN — HYDROCODONE BITARTRATE AND ACETAMINOPHEN 1 TABLET: 5; 325 TABLET ORAL at 11:03

## 2019-03-28 RX ADMIN — DEXTROSE, SODIUM CHLORIDE, SODIUM LACTATE, POTASSIUM CHLORIDE, AND CALCIUM CHLORIDE: 5; .6; .31; .03; .02 INJECTION, SOLUTION INTRAVENOUS at 04:03

## 2019-03-28 RX ADMIN — KETOROLAC TROMETHAMINE 30 MG: 30 INJECTION, SOLUTION INTRAMUSCULAR at 11:03

## 2019-03-28 RX ADMIN — LIDOCAINE HYDROCHLORIDE,EPINEPHRINE BITARTRATE 10 ML: 20; .005 INJECTION, SOLUTION EPIDURAL; INFILTRATION; INTRACAUDAL; PERINEURAL at 09:03

## 2019-03-28 RX ADMIN — FENTANYL CITRATE 100 MCG: 50 INJECTION, SOLUTION INTRAMUSCULAR; INTRAVENOUS at 05:03

## 2019-03-28 NOTE — L&D DELIVERY NOTE
[facility]   Section   Operative Note    SUMMARY     Date of Procedure: 3/28/2019     Procedure: Procedure(s) (LRB):   SECTION (N/A)    Surgeon(s) and Role:     * Ying Levi MD - Primary    Assistant:  Leanne Glynn PA-C, assistance necessary for completion of surgery    Pre-Operative Diagnosis:   1.  IUP at 40w0d  2.  Arrest of dilation in active labor    Post-Operative Diagnosis: Post-Op Diagnosis Codes:  1.  IUP at 40w0d  2.  Arrest of dilation in active labor    Anesthesia: Spinal/Epidural    Technical Procedures Used: Primary low transverse  delivery via Pfannensteil skin incision           Description of the Findings of the Procedure: Fetal head wedged in the maternal pelvis with significant caput.  Female infant (noemi Beaulieu), cephalic presentation, 7lb5oz, APGARS 8/9.  3 vessel cord.  Placenta delivered spontaneously, intact.  Normal uterus and tubes.  Bilateral ovaries with polycystic ovary appearance with multiple small follicles    Significant Surgical Tasks Conducted by the Assistant(s), if Applicable: Retraction, exposure, skin closure    Complications: No    Blood Loss: 800 mL     With patient in supine position, the legs are  and Chopra Catheter placed and positioning to supine done.   Abdomen prepped with Chloroprep and 3 minute drying time allowed prior to draping of the abdomen.   Time out taken with OR team members.  Pfannenstiel Incision made through the skin, transverse fascial incision developed, rectus muscles  in the midline and the peritoneum entered.   no adhesions noted.  The lower uterine segment and position of the fetus identified.   Bladder flap taken down through transverse peritoneal incision.    Low Transverse Incision made through well developer lower uterine segment and extended laterally with blunt dissection.   Clear fluid noted.  Infant delivered from vertex presentation.  Cord clamped after one minute and   handed to attending nurse.  Cord blood taken, placenta delivered.  The uterus wasnot exteriorized.  The edges of the uterine incision are grasped with Carmichael clamps at the angles and the inferior and superior midline edges of the incision.    Closure with running lock 0 Chromic, starting at each angle, tying in the midline.   Observation for bleeding with suture of any bleeding along the hysterotomy line.   With good hemostasis noted, the anterior pelvis is rinsed with sterile saline.   Right and left adnexa with anatomy as stated above.     Closure of the abdomen with 2 0 Vicryl running of the peritoneum, fascial closure with 0 looped PDS starting at each angle and tying the knot at the midline.  Subcutaneous layer closed with 2-0 plain gut interrupted.  Skin closure with 4 0 Monocryl subcuticular.  Wound dressed with Aquasel and pressure dressing.          Specimens:   Specimen (12h ago, onward)    None          Condition: Good    Disposition: PACU - hemodynamically stable.    Attestation: Good         Delivery Information for  Inder Hernandes    Birth information:  YOB: 2017   Time of birth: 10:17 AM   Sex: female   Head Delivery Date/Time:     Delivery type:    Gestational Age: 40w0d    Delivery Providers    Delivering clinician:             Measurements    Weight:    Length:           Apgars    Living status:    Apgars:   1 min.:   5 min.:   10 min.:   15 min.:   20 min.:     Skin color:          Heart rate:          Reflex irritability:          Muscle tone:          Respiratory effort:          Total:                                 Interventions/Resuscitation         Cord    No data filed        Placenta    Placenta delivery date/time:    Placenta removal:             Labor Events:       labor:       Labor Onset Date/Time:         Dilation Complete Date/Time:         Start Pushing Date/Time:       Rupture Date/Time:              Rupture type:           Fluid Amount:        Fluid  Color:        Fluid Odor:        Membrane Status (PeriCalm): ARM (Artificial Rupture)      Rupture Date/Time (PeriCalm): 2019 19:10:00      Fluid Amount (PeriCalm): Small      Fluid Color (PeriCalm): Pinkish       steroids:       Antibiotics given for GBS:       Induction:       Indications for induction:        Augmentation:       Indications for augmentation:       Labor complications:       Additional complications:          Cervical ripening:                     Delivery:      Episiotomy:       Indication for Episiotomy:       Perineal Lacerations:   Repaired:      Periurethral Laceration:   Repaired:     Labial Laceration:   Repaired:     Sulcus Laceration:   Repaired:     Vaginal Laceration:   Repaired:     Cervical Laceration:   Repaired:     Repair suture:       Repair # of packets:       Last Value - EBL - Nursing (mL):       Sum - EBL - Nursing (mL): 800     Last Value - EBL - Anesthesia (mL): 800      Calculated QBL (mL):        Vaginal Sweep Performed:       Surgicount Correct:         Other providers:            Details (if applicable):  Trial of Labor      Categorization:      Priority:     Indications for :     Incision Type:       Additional  information:  Forceps:    Vacuum:    Breech:    Observed anomalies    Other (Comments):

## 2019-03-28 NOTE — H&P
[facility]  Obstetrics  History & Physical    Patient Name: Favian Hernandes  MRN: [id number]  Admission Date: 3/27/2019  Primary Care Provider: Williams Deutsch MD    Subjective:     Principal Problem:Pre-eclampsia in third trimester    History of Present Illness:  No notes on file    Obstetric HPI:  Patient reports from clinic with elevated bp's and occ ctx , is admitted for IOL  active fetal movement, No vaginal bleeding , No loss of fluid     This pregnancy has been complicated by OBESITY,hypokalemia, incompetent cervix, hx of fetal demise     OB History    Para Term  AB Living   3 1 0 1 1 0   SAB TAB Ectopic Multiple Live Births   1 0 0 0 1      # Outcome Date GA Lbr Gus/2nd Weight Sex Delivery Anes PTL Lv   3 Current            2 SAB            1  03 25w0d  0.652 kg (1 lb 7 oz) F Vag-Spont   ND     Past Medical History:   Diagnosis Date    H/O incompetent cervix, currently pregnant 2018     Past Surgical History:   Procedure Laterality Date    CERCLAGE, CERVIX N/A 2018    Performed by Benita Sparks MD at [facility] OR       [facility] Medications   Medication Sig    ferrous sulfate (FEOSOL) 325 mg (65 mg iron) Tab tablet Take 1 tablet (325 mg total) by mouth once daily.    prenatal 25/iron fum/folic/dha (PRENATAL-1 ORAL) Take 2 tablets by mouth once daily. 2 gummies/day       Review of patient's allergies indicates:  No Known Allergies     Family History     Problem Relation (Age of Onset)    Diabetes Father    Hypertension Mother        Tobacco Use    Smoking status: Never Smoker    Smokeless tobacco: Never Used   Substance and Sexual Activity    Alcohol use: No     Comment: socally     Drug use: No    Sexual activity: Yes     Partners: Male     Birth control/protection: None     Review of Systems   All other systems reviewed and are negative.     Objective:     Vital Signs (Most Recent):  Temp: 99.1 °F (37.3 °C) (19 1230)  Pulse: (!) 116 (19  1300)  Resp: 18 (19 1137)  BP: (!) 143/83 (19 1345)  SpO2: 99 % (19 1300) Vital Signs (24h Range):  Temp:  [97.5 °F (36.4 °C)-99.5 °F (37.5 °C)] 99.1 °F (37.3 °C)  Pulse:  [] 116  Resp:  [16-20] 18  SpO2:  [73 %-100 %] 99 %  BP: (108-196)/() 143/83        There is no height or weight on file to calculate BMI.    FHT: 125Cat 2 (reassuring)  TOCO: Irregular     Physical Exam:   Constitutional: She is oriented to person, place, and time. She appears well-developed and well-nourished.    HENT:   Head: Normocephalic.    Eyes: Pupils are equal, round, and reactive to light.    Neck: Normal range of motion.    Cardiovascular: Normal rate and regular rhythm.     Pulmonary/Chest: Effort normal.        Abdominal: Soft.     Genitourinary: Vagina normal and uterus normal.           Musculoskeletal: Normal range of motion.       Neurological: She is alert and oriented to person, place, and time. She has normal reflexes.   No clonus denies ha or blurred vision and no epigastric pain     Skin: Skin is warm and dry.    Psychiatric: She has a normal mood and affect. Her behavior is normal. Judgment and thought content normal.       Cervix:  Dilation:  3  Effacement:  80   Station: -2  Presentation: Vertex     Significant Labs:  Lab Results   Component Value Date    GROUPTRH B POS 2019    HEPBSAG Negative 2018    STREPBCULT No Group B Streptococcus isolated 2019       I have personallly reviewed all pertinent lab results from the last 24 hours.    Assessment/Plan:     38 y.o. female  at 40w0d for:    * Pre-eclampsia in third trimester  Admit, Pitocin, IOL  Pre e workup     Pt asymptomatic.  BP moderately elevated with a few values in the severe range.  Will monitor postpartum, and consider starting antihypertensive after delivery if necessary.    Cephalopelvic disproportion due to mixed maternal and fetal factors  Patient with arrest of dilation in the active phase of labor with  no significant cervical change in over 4 hours with adequate uterine contractions.  Discussed primary  delivery, and the patient and her significant other agree with  delivery.  Consents were reviewed in detail and signed.  All questions answered.  Proceed with primary LTCS for arrest of dilation.        Paige Tomas CNM  Obstetrics  [facility]

## 2019-03-28 NOTE — LACTATION NOTE
Lactation Rounds:    Mother does not have plans to direct breastfeed. She plans to pump and give EBM and formula. [employer] Symphony breast pump set up at bedside.  Instructed on proper usage and to adjust suction according to comfort level.  Reviewed frequency and duration of pumping in order to promote and maintain full milk supply. Hands-on pumping technique reviewed. Encouraged hand expression after. Instructed on proper cleaning of breast pump parts. Reviewed proper milk handling, collection, storage, and transportation. Voices understanding. Offered to hand express with mother, she declines and states she just did with primary nurse.     Mother verbalizes understanding of expected  behaviors and output for the first 48 hours of life.  Discussed the importance of cue based feedings on demand,  frequent uninterrupted skin to skin contact. Encouraged mother to call for assistance when desired contact number provided, mother verbalizes understanding.

## 2019-03-28 NOTE — SUBJECTIVE & OBJECTIVE
Interval History:  Favian is a 38 y.o.  at 40w0d. She is doing well. Resting with NATHAN in place. Waiting on epidural reinjection.     Objective:     Vital Signs (Most Recent):  Temp: 98.4 °F (36.9 °C) (19)  Pulse: 73 (19)  Resp: 18 (19)  BP: (!) 155/69 (19)  SpO2: 100 % (19 0524) Vital Signs (24h Range):  Temp:  [97.5 °F (36.4 °C)-99 °F (37.2 °C)] 98.4 °F (36.9 °C)  Pulse:  [] 73  Resp:  [16-20] 18  SpO2:  [73 %-100 %] 100 %  BP: (108-172)/() 155/69        There is no height or weight on file to calculate BMI.    FHT: 140 Cat 2 (reassuring)- minimal variability  TOCO:  Q 2-4 minutes    Cervical Exam:  Dilation:  7  Effacement:  90  Station: -1  Presentation: Vertex     Significant Labs:  Lab Results   Component Value Date    GROUPTRH B POS 2019    HEPBSAG Negative 2018    STREPBCULT No Group B Streptococcus isolated 2019       I have personallly reviewed all pertinent lab results from the last 24 hours.    Physical Exam:   Constitutional: She is oriented to person, place, and time. She appears well-developed and well-nourished.    HENT:   Head: Normocephalic.     Neck: Normal range of motion.    Cardiovascular: Normal rate, regular rhythm and normal heart sounds.     Pulmonary/Chest: Effort normal and breath sounds normal.        Abdominal: Soft.     Genitourinary: Vagina normal and uterus normal.           Musculoskeletal: Normal range of motion and moves all extremeties.       Neurological: She is alert and oriented to person, place, and time. She has normal reflexes.    Skin: Skin is warm and dry.

## 2019-03-28 NOTE — ASSESSMENT & PLAN NOTE
Admit, Pitocin, IOL  Pre e workup     Pt asymptomatic.  BP moderately elevated with a few values in the severe range.  Will monitor postpartum, and consider starting antihypertensive after delivery if necessary.

## 2019-03-28 NOTE — PROGRESS NOTES
S:Doing well, sidra in place   O:  VS reviewed, afebrile   Vitals:    03/27/19 2232 03/27/19 2302 03/27/19 2332 03/28/19 0002   BP: (!) 158/94 (!) 161/88 (!) 156/79 (!) 144/66   Pulse: 82 89 95 74   Resp:  20  18   Temp:  97.5 °F (36.4 °C)  97.7 °F (36.5 °C)   TempSrc:  Oral  Oral   SpO2:           FHTs: 125 cat 2   UC: mvu 185  SVE: 4.5/90/-1 per RN     A: IUP @ 40w0d ;     Patient Active Problem List   Diagnosis    High-risk pregnancy in third trimester    H/O incompetent cervix, currently pregnant    Incompetent cervix during second trimester, antepartum    Obesity affecting pregnancy in third trimester    Elderly multigravida in third trimester    Thrombocytosis    Encounter for elective induction of labor    Gestational hypertension, third trimester    Gestational hypertension    Pre-eclampsia in third trimester       P: DC Pitocin for 30 minutes, then restart and titrate per orders   Peanut ball  Continue close monitoring of maternal/fetal status  Anticipate vag del

## 2019-03-28 NOTE — PROGRESS NOTES
Pt seen and examined.  Aquasel and pressure dressings were saturated earlier, so both were replaced.  Pt reports that she feels great.  No HA, change in vision, CP, SOB, RUQ, or epigastric pain.  No heavy vaginal bleeding.    's-150's/90's.    Dressing examined.  New dressing is clean and dry with no evidence of active bleeding.  Add CBC to 6pm BMP.  Start procardia 30 XL daily.

## 2019-03-28 NOTE — ANESTHESIA POSTPROCEDURE EVALUATION
Anesthesia Post Evaluation    Patient: Favian Hernandes    Procedure(s) Performed: Procedure(s) (LRB):   SECTION (N/A)    Final Anesthesia Type: epidural  Patient location during evaluation: labor & delivery  Patient participation: Yes- Able to Participate  Level of consciousness: awake and alert and oriented  Post-procedure vital signs: reviewed and stable  Pain management: adequate  Airway patency: patent  PONV status at discharge: No PONV  Anesthetic complications: no      Cardiovascular status: hemodynamically stable  Respiratory status: unassisted, room air and spontaneous ventilation  Hydration status: euvolemic  Follow-up not needed.          Vitals Value Taken Time   /88 3/28/2019 11:37 AM   Temp 37.4 °C (99.4 °F) 3/28/2019 11:37 AM   Pulse 117 3/28/2019 11:37 AM   Resp 18 3/28/2019 11:37 AM   SpO2 100 % 3/28/2019 11:37 AM         No case tracking events are documented in the log.      Pain/Uriah Score: Pain Rating Prior to Med Admin: 2 (3/27/2019  6:58 PM)

## 2019-03-28 NOTE — PROGRESS NOTES
Late entry  0150: patient calls out with increased pain 10/10 in lower abdomen and back and is requesting epidural bolus.  0155: CRNA made aware of patient's pain. States she will be in room shortly.  0157: SVE by this RN no change in exam. 4-5/90/-1. Noted caput.  Patient tolerated well.   noted decel on monitor. Patient turned to left and then right side CNM Shaver made aware.   0203: FHR return to baseline.

## 2019-03-28 NOTE — ASSESSMENT & PLAN NOTE
Pt asymptomatic.  BP moderately elevated with a few values in the severe range.  Will monitor postpartum, and consider starting antihypertensive after delivery if necessary.

## 2019-03-28 NOTE — PROGRESS NOTES
[facility]  Obstetrics  Labor Progress Note    Patient Name: Favian Hernandes  MRN: [id number]  Admission Date: 3/27/2019  Hospital Length of Stay: 1 days  Attending Physician: Thomas Branham MD  Primary Care Provider: Williams Deutsch MD    Subjective:     Principal Problem:Gestational hypertension, third trimester    Hospital Course:  No notes on file    Interval History:  Favian is a 38 y.o.  at 40w0d. She is doing well. Resting with NATHAN in place. Waiting on epidural reinjection.     Objective:     Vital Signs (Most Recent):  Temp: 98.4 °F (36.9 °C) (19)  Pulse: 73 (19)  Resp: 18 (19)  BP: (!) 155/69 (19)  SpO2: 100 % (19 0524) Vital Signs (24h Range):  Temp:  [97.5 °F (36.4 °C)-99 °F (37.2 °C)] 98.4 °F (36.9 °C)  Pulse:  [] 73  Resp:  [16-20] 18  SpO2:  [73 %-100 %] 100 %  BP: (108-172)/() 155/69        There is no height or weight on file to calculate BMI.    FHT: 140 Cat 2 (reassuring)- minimal variability  TOCO:  Q 2-4 minutes    Cervical Exam:  Dilation:  7  Effacement:  90  Station: -1  Presentation: Vertex     Significant Labs:  Lab Results   Component Value Date    GROUPTRH B POS 2019    HEPBSAG Negative 2018    STREPBCULT No Group B Streptococcus isolated 2019       I have personallly reviewed all pertinent lab results from the last 24 hours.    Physical Exam:   Constitutional: She is oriented to person, place, and time. She appears well-developed and well-nourished.    HENT:   Head: Normocephalic.     Neck: Normal range of motion.    Cardiovascular: Normal rate, regular rhythm and normal heart sounds.     Pulmonary/Chest: Effort normal and breath sounds normal.        Abdominal: Soft.     Genitourinary: Vagina normal and uterus normal.           Musculoskeletal: Normal range of motion and moves all extremeties.       Neurological: She is alert and oriented to person, place, and time. She has normal  reflexes.    Skin: Skin is warm and dry.        Assessment/Plan:     38 y.o. female  at 40w0d for:    * Gestational hypertension, third trimester  Rest from pitocin for now. Will restart once resting tone decreases. Spoke with Dr. GLADYS Levi and will continue to monitor.           Lakshmi Hunt CNM  Obstetrics  [facility]

## 2019-03-28 NOTE — SUBJECTIVE & OBJECTIVE
Interval History:  Favian is a 38 y.o.  at 40w0d. She is having a lot of pain with each contraction.    Objective:     Vital Signs (Most Recent):  Temp: 99.1 °F (37.3 °C) (19 0815)  Pulse: 105 (19 0845)  Resp: 18 (19 0602)  BP: (!) 196/89 (19 0845)  SpO2: 100 % (19 0845) Vital Signs (24h Range):  Temp:  [97.5 °F (36.4 °C)-99.5 °F (37.5 °C)] 99.1 °F (37.3 °C)  Pulse:  [] 105  Resp:  [16-20] 18  SpO2:  [73 %-100 %] 100 %  BP: (108-196)/() 196/89        There is no height or weight on file to calculate BMI.    FHT: Cat 2 (minimal BTB, no accels, no decels)  TOCO:  Q 3 minutes    Cervical Exam:  Per CNM: cervix unchanged at 6-7 cm, more edema, and more caput.  No significant change since cervical exam done at 5 AM     Significant Labs:  Lab Results   Component Value Date    GROUPTRH B POS 2019    HEPBSAG Negative 2018    STREPBCULT No Group B Streptococcus isolated 2019       Recent Lab Results       19  1256   19  1200        Albumin   2.4     Alkaline Phosphatase   193     ALT   21     Anion Gap   9     AST   27     Baso #   0.02     Basophil%   0.4     Total Bilirubin   0.5  Comment:  For infants and newborns, interpretation of results should be based  on gestational age, weight and in agreement with clinical  observations.  Premature Infant recommended reference ranges:  Up to 24 hours.............<8.0 mg/dL  Up to 48 hours............<12.0 mg/dL  3-5 days..................<15.0 mg/dL  6-29 days.................<15.0 mg/dL       BUN, Bld   <2     Calcium   8.6     Chloride   106     CO2   22     Creatinine   0.7     Creatinine, Random Ur 162.3  Comment:  The random urine reference ranges provided were established   for 24 hour urine collections.  No reference ranges exist for  random urine specimens.  Correlate clinically.         Differential Method   Automated     eGFR if    >60     eGFR if non     >60  Comment:  Calculation used to obtain the estimated glomerular filtration  rate (eGFR) is the CKD-EPI equation.        Eos #   0.1     Eosinophil%   1.1     Glucose   74     Gran # (ANC)   3.5     Gran%   61.7     Group & Rh   B POS     Hematocrit   34.0     Hemoglobin   11.7     INDIRECT QUANG   NEG     Lymph #   1.5     Lymph%   26.4     MCH   28.7     MCHC   34.4     MCV   84     Mono #   0.6     Mono%   10.4     MPV   10.1     Platelets   437     Potassium   2.8     Prot/Creat Ratio, Ur 0.43       Total Protein   6.4     Protein, Urine Random 70  Comment:  The random urine reference ranges provided were established   for 24 hour urine collections.  No reference ranges exist for  random urine specimens.  Correlate clinically.         RBC   4.07     RDW   13.5     Sodium   137     WBC   5.65           Physical Exam:   Constitutional: She is oriented to person, place, and time. She appears well-developed and well-nourished. She appears distressed (in pain with ctxns).             Abdominal:   Skin of the suprapubic area appears healthy                 Neurological: She is alert and oriented to person, place, and time.

## 2019-03-28 NOTE — PROGRESS NOTES
S: Pt co pain crna at bs sidra in place   O:  VS reviewed, afebrile   Vitals:    03/28/19 0334 03/28/19 0339 03/28/19 0340 03/28/19 0502   BP:    (!) 172/93   Pulse: 97 93 73 80   Resp:       Temp:       TempSrc:       SpO2: 100% 100% (!) 84%        FHTs: 125, cat 2 reassuring   UC: IUPC mvu 265  SVE:6/75/-1 cervical edema noted     A: IUP @ 40w0d ;     Patient Active Problem List   Diagnosis    High-risk pregnancy in third trimester    H/O incompetent cervix, currently pregnant    Incompetent cervix during second trimester, antepartum    Obesity affecting pregnancy in third trimester    Elderly multigravida in third trimester    Thrombocytosis    Encounter for elective induction of labor    Gestational hypertension, third trimester    Gestational hypertension    Pre-eclampsia in third trimester       P: Pitocin  Benadryl 12.5 mg iv  Continue close monitoring of maternal/fetal status  Anticipate vag del

## 2019-03-28 NOTE — PROGRESS NOTES
[facility]   Obstetrics  Labor Progress Note    Patient Name: Favian Hernandes  MRN: [id number]  Admission Date: 3/27/2019  Hospital Length of Stay: 1 days  Attending Physician: Thomas Branham MD  Primary Care Provider: Williams Deutsch MD    Subjective:     Principal Problem:Pre-eclampsia in third trimester    Hospital Course:  Patient with preeclampsia on admit.  Underwent induction of labor.    Interval History:  Favian is a 38 y.o.  at 40w0d. She is having a lot of pain with each contraction.    Objective:     Vital Signs (Most Recent):  Temp: 99.1 °F (37.3 °C) (19 0815)  Pulse: 105 (19 0845)  Resp: 18 (19 0602)  BP: (!) 196/89 (19 0845)  SpO2: 100 % (19 0845) Vital Signs (24h Range):  Temp:  [97.5 °F (36.4 °C)-99.5 °F (37.5 °C)] 99.1 °F (37.3 °C)  Pulse:  [] 105  Resp:  [16-20] 18  SpO2:  [73 %-100 %] 100 %  BP: (108-196)/() 196/89        There is no height or weight on file to calculate BMI.    FHT: Cat 2 (minimal BTB, no accels, no decels)  TOCO:  Q 3 minutes    Cervical Exam:  Per CNM: cervix unchanged at 6-7 cm, more edema, and more caput.  No significant change since cervical exam done at 5 AM     Significant Labs:  Lab Results   Component Value Date    GROUPTRH B POS 2019    HEPBSAG Negative 2018    STREPBCULT No Group B Streptococcus isolated 2019       Recent Lab Results       19  1256   19  1200        Albumin   2.4     Alkaline Phosphatase   193     ALT   21     Anion Gap   9     AST   27     Baso #   0.02     Basophil%   0.4     Total Bilirubin   0.5  Comment:  For infants and newborns, interpretation of results should be based  on gestational age, weight and in agreement with clinical  observations.  Premature Infant recommended reference ranges:  Up to 24 hours.............<8.0 mg/dL  Up to 48 hours............<12.0 mg/dL  3-5 days..................<15.0 mg/dL  6-29 days.................<15.0 mg/dL        BUN, Bld   <2     Calcium   8.6     Chloride   106     CO2   22     Creatinine   0.7     Creatinine, Random Ur 162.3  Comment:  The random urine reference ranges provided were established   for 24 hour urine collections.  No reference ranges exist for  random urine specimens.  Correlate clinically.         Differential Method   Automated     eGFR if    >60     eGFR if non    >60  Comment:  Calculation used to obtain the estimated glomerular filtration  rate (eGFR) is the CKD-EPI equation.        Eos #   0.1     Eosinophil%   1.1     Glucose   74     Gran # (ANC)   3.5     Gran%   61.7     Group & Rh   B POS     Hematocrit   34.0     Hemoglobin   11.7     INDIRECT QUANG   NEG     Lymph #   1.5     Lymph%   26.4     MCH   28.7     MCHC   34.4     MCV   84     Mono #   0.6     Mono%   10.4     MPV   10.1     Platelets   437     Potassium   2.8     Prot/Creat Ratio, Ur 0.43       Total Protein   6.4     Protein, Urine Random 70  Comment:  The random urine reference ranges provided were established   for 24 hour urine collections.  No reference ranges exist for  random urine specimens.  Correlate clinically.         RBC   4.07     RDW   13.5     Sodium   137     WBC   5.65           Physical Exam:   Constitutional: She is oriented to person, place, and time. She appears well-developed and well-nourished. She appears distressed (in pain with ctxns).             Abdominal:   Skin of the suprapubic area appears healthy                 Neurological: She is alert and oriented to person, place, and time.         Assessment/Plan:     38 y.o. female  at 40w0d for:    * Pre-eclampsia in third trimester  Pt asymptomatic.  BP moderately elevated with a few values in the severe range.  Will monitor postpartum, and consider starting antihypertensive after delivery if necessary.    Cephalopelvic disproportion due to mixed maternal and fetal factors  Patient with arrest of dilation in the  active phase of labor with no significant cervical change in over 4 hours with adequate uterine contractions.  Discussed primary  delivery, and the patient and her significant other agree with  delivery.  Consents were reviewed in detail and signed.  All questions answered.  Proceed with primary LTCS for arrest of dilation.    Gestational hypertension, third trimester  Rest from pitocin for now. Will restart once resting tone decreases. Spoke with Dr. GLADYS Levi and will continue to monitor.           Ying Levi MD  Obstetrics  [facility]

## 2019-03-28 NOTE — PROGRESS NOTES
S: very uncomfortable, hurting, able to move  O:  VS reviewed, afebrile   Vitals:    19 0800 19 0815 19 0830 19 0845   BP: (!) 173/80 (!) 119/41 (!) 189/89 (!) 196/89   Pulse: 104 107 109 105   Resp:       Temp:  99.1 °F (37.3 °C)     TempSrc:  Oral     SpO2: 100% 100% 100% 100%       FHTs cat 2, minimal variability  UC inadequate  SVE /-2    A: IUP @ 40w0d ;     Patient Active Problem List   Diagnosis    High-risk pregnancy in third trimester    H/O incompetent cervix, currently pregnant    Incompetent cervix during second trimester, antepartum    Obesity affecting pregnancy in third trimester    Elderly multigravida in third trimester    Thrombocytosis    Encounter for elective induction of labor    Gestational hypertension, third trimester    Gestational hypertension    Pre-eclampsia in third trimester       P:   Continue close monitoring of maternal/fetal status. Unable to re-start pitocin due to fetal minimal variability.   Patient is requesting a . Spoke with Dr. Levi and will prep for c/s now.

## 2019-03-28 NOTE — HOSPITAL COURSE
"Admit pre e pitocin IOL     Patient with preeclampsia on admit.  Underwent induction of labor.  Failure to progress beyond 7 cm.  She underwent a primary LTCS, and delivered a viable female infant (baby "Ryleigh").  She and the baby were transferred to [facility] for postpartum care.  Procardia 30 XL daily for blood pressure control.   Hypokalemia noted on admission.  Potassium was replaced intravenously initially, and improved.  Patient switched to po potassium replacement.    Post-op course was complicated by incisional bleeding that resolved with a pressure dressing.  She also developed asymptomatic anemia.  Oral iron supplement started.  Hb/Hct stabilized at 8.0/23.9, and patient remains asymptomatic with no evidence of active bleeding.  Continue iron outpatient.  Blood pressure stable on procardia 30 XL daily throughout her postpartum course.  Blood pressure check in 1 week.  "

## 2019-03-28 NOTE — PROGRESS NOTES
S:Doing well with NATHAN   O:  VS reviewed, afebrile   Vitals:    03/27/19 1903 03/27/19 1918 03/27/19 1932 03/27/19 2002   BP: (!) 168/83 (!) 151/70 (!) 152/75 (!) 153/78   Pulse: 70 69 76 75   Resp:  20  20   Temp:  98.5 °F (36.9 °C)  98.5 °F (36.9 °C)   TempSrc:  Oral  Oral   SpO2:           FHTs: 128, CAT 1   UC:  MODERATE   SVE:3/90/-2 AROM CLEAR     A: IUP @ 39w6d ;     Patient Active Problem List   Diagnosis    High-risk pregnancy in third trimester    H/O incompetent cervix, currently pregnant    Incompetent cervix during second trimester, antepartum    Obesity affecting pregnancy in third trimester    Elderly multigravida in third trimester    Thrombocytosis    Encounter for elective induction of labor    Gestational hypertension, third trimester    Gestational hypertension    Pre-eclampsia in third trimester       P: Cont Pitocin  Continue close monitoring of maternal/fetal status  Anticipate vag del

## 2019-03-28 NOTE — SUBJECTIVE & OBJECTIVE
Obstetric HPI:  Patient reports from clinic with elevated bp's and occ ctx , is admitted for IOL  active fetal movement, No vaginal bleeding , No loss of fluid     This pregnancy has been complicated by OBESITY,hypokalemia, incompetent cervix, hx of fetal demise     OB History    Para Term  AB Living   3 1 0 1 1 0   SAB TAB Ectopic Multiple Live Births   1 0 0 0 1      # Outcome Date GA Lbr Gus/2nd Weight Sex Delivery Anes PTL Lv   3 Current            2 SAB            1  03 25w0d  0.652 kg (1 lb 7 oz) F Vag-Spont   ND     Past Medical History:   Diagnosis Date    H/O incompetent cervix, currently pregnant 2018     Past Surgical History:   Procedure Laterality Date    CERCLAGE, CERVIX N/A 2018    Performed by Benita Sparks MD at [facility] OR       [facility] Medications   Medication Sig    ferrous sulfate (FEOSOL) 325 mg (65 mg iron) Tab tablet Take 1 tablet (325 mg total) by mouth once daily.    prenatal 25/iron fum/folic/dha (PRENATAL-1 ORAL) Take 2 tablets by mouth once daily. 2 gummies/day       Review of patient's allergies indicates:  No Known Allergies     Family History     Problem Relation (Age of Onset)    Diabetes Father    Hypertension Mother        Tobacco Use    Smoking status: Never Smoker    Smokeless tobacco: Never Used   Substance and Sexual Activity    Alcohol use: No     Comment: socally     Drug use: No    Sexual activity: Yes     Partners: Male     Birth control/protection: None     Review of Systems   All other systems reviewed and are negative.     Objective:     Vital Signs (Most Recent):  Temp: 99.1 °F (37.3 °C) (19 1230)  Pulse: (!) 116 (19 1300)  Resp: 18 (19 1137)  BP: (!) 143/83 (19 1345)  SpO2: 99 % (19 1300) Vital Signs (24h Range):  Temp:  [97.5 °F (36.4 °C)-99.5 °F (37.5 °C)] 99.1 °F (37.3 °C)  Pulse:  [] 116  Resp:  [16-20] 18  SpO2:  [73 %-100 %] 99 %  BP: (108-196)/() 143/83        There is no height  or weight on file to calculate BMI.    FHT: 125Cat 2 (reassuring)  TOCO: Irregular     Physical Exam:   Constitutional: She is oriented to person, place, and time. She appears well-developed and well-nourished.    HENT:   Head: Normocephalic.    Eyes: Pupils are equal, round, and reactive to light.    Neck: Normal range of motion.    Cardiovascular: Normal rate and regular rhythm.     Pulmonary/Chest: Effort normal.        Abdominal: Soft.     Genitourinary: Vagina normal and uterus normal.           Musculoskeletal: Normal range of motion.       Neurological: She is alert and oriented to person, place, and time. She has normal reflexes.   No clonus denies ha or blurred vision and no epigastric pain     Skin: Skin is warm and dry.    Psychiatric: She has a normal mood and affect. Her behavior is normal. Judgment and thought content normal.       Cervix:  Dilation:  3  Effacement:  80   Station: -2  Presentation: Vertex     Significant Labs:  Lab Results   Component Value Date    GROUPTRH B POS 03/27/2019    HEPBSAG Negative 08/08/2018    STREPBCULT No Group B Streptococcus isolated 02/21/2019       I have personallly reviewed all pertinent lab results from the last 24 hours.

## 2019-03-28 NOTE — NURSING
Dr Levi notified of Aquacel being saturated with blood clots on removal of dressing.  CBC ordered to be drawn with current scheduled labs at 1800.  New Aquacel and pressure dressing being applied.

## 2019-03-28 NOTE — PROGRESS NOTES
2300: SVE by this RN. Patient 4-5/90/-1. Patient c/o sharp shooting pain 10/10 in lower back. JAYME Enriquez notified. States will be at bedside as soon as possible.

## 2019-03-28 NOTE — PLAN OF CARE
Problem: Adult Inpatient Plan of Care  Goal: Plan of Care Review  Outcome: Ongoing (interventions implemented as appropriate)  Patient is resting comfortably after primary  at 0950. Pressure dressing was saturated at 1600. Dressing was changed and has been clean, dry, and intact since. Patient is reporting no pain at this time.

## 2019-03-28 NOTE — NURSING
Abdominal dressing noted to be saturated with blood through ABD and aquacel. Dr. Levi notified. Instructed to change dressing and continue to monitor.

## 2019-03-28 NOTE — ASSESSMENT & PLAN NOTE
Rest from pitocin for now. Will restart once resting tone decreases. Spoke with Dr. GLADYS Levi and will continue to monitor.

## 2019-03-28 NOTE — OP NOTE
[facility]   Section   Operative Note    SUMMARY     Date of Procedure: 3/28/2019     Procedure: Procedure(s) (LRB):   SECTION (N/A)    Surgeon(s) and Role:     * Ying Levi MD - Primary    Assistant:  Leanne Glynn PA-C, assistance necessary for completion of surgery    Pre-Operative Diagnosis:   1.  IUP at 40w0d  2.  Arrest of dilation in active labor    Post-Operative Diagnosis: Post-Op Diagnosis Codes:  1.  IUP at 40w0d  2.  Arrest of dilation in active labor    Anesthesia: Spinal/Epidural    Technical Procedures Used: Primary low transverse  delivery via Pfannensteil skin incision           Description of the Findings of the Procedure: Fetal head wedged in the maternal pelvis with significant caput.  Female infant (noemi Beaulieu), cephalic presentation, 7lb5oz, APGARS 8/9.  3 vessel cord.  Placenta delivered spontaneously, intact.  Normal uterus and tubes.  Bilateral ovaries with polycystic ovary appearance with multiple small follicles    Significant Surgical Tasks Conducted by the Assistant(s), if Applicable: Retraction, exposure, skin closure    Complications: No    Blood Loss: 800 mL     With patient in supine position, the legs are  and Chopra Catheter placed and positioning to supine done.   Abdomen prepped with Chloroprep and 3 minute drying time allowed prior to draping of the abdomen.   Time out taken with OR team members.  Pfannenstiel Incision made through the skin, transverse fascial incision developed, rectus muscles  in the midline and the peritoneum entered.   no adhesions noted.  The lower uterine segment and position of the fetus identified.   Bladder flap taken down through transverse peritoneal incision.    Low Transverse Incision made through well developer lower uterine segment and extended laterally with blunt dissection.   Clear fluid noted.  Infant delivered from vertex presentation.  Cord clamped after one minute and   handed to attending nurse.  Cord blood taken, placenta delivered.  The uterus wasnot exteriorized.  The edges of the uterine incision are grasped with Carmichael clamps at the angles and the inferior and superior midline edges of the incision.    Closure with running lock 0 Chromic, starting at each angle, tying in the midline.   Observation for bleeding with suture of any bleeding along the hysterotomy line.   With good hemostasis noted, the anterior pelvis is rinsed with sterile saline.   Right and left adnexa with anatomy as stated above.     Closure of the abdomen with 2 0 Vicryl running of the peritoneum, fascial closure with 0 looped PDS starting at each angle and tying the knot at the midline.  Subcutaneous layer closed with 2-0 plain gut interrupted.  Skin closure with 4 0 Monocryl subcuticular.  Wound dressed with Aquasel and pressure dressing.          Specimens:   Specimen (12h ago, onward)    None          Condition: Good    Disposition: PACU - hemodynamically stable.    Attestation: Good

## 2019-03-28 NOTE — ASSESSMENT & PLAN NOTE
Patient with arrest of dilation in the active phase of labor with no significant cervical change in over 4 hours with adequate uterine contractions.  Discussed primary  delivery, and the patient and her significant other agree with  delivery.  Consents were reviewed in detail and signed.  All questions answered.  Proceed with primary LTCS for arrest of dilation.

## 2019-03-28 NOTE — PHYSICIAN QUERY
PT Name: Favian Hernandes  MR #: [id number]     PHYSICIAN QUERY -  ELECTROLYTE CLARIFICATION      CDS/: EFRAIN Hardy,RNC-[email or web address]         Contact information:[email or web address]  This form is a permanent document in the medical record.     Query Date: March 28, 2019    By submitting this query, we are merely seeking further clarification of documentation to reflect the severity of illness of your patient. Please utilize your independent clinical judgment when addressing the question(s) below.    The Medical record reflects the following:     Indicators   Supporting Clinical Findings Location in Medical Record   X Lab Value(s) Potassium=2.8 LAB 3/27   X Treatment                                 Medication potassium chloride 10 mEq in 100 mL IVPB MAR 3/28    Other       Provider, please specify the diagnosis or diagnoses that correspond(s) to the above indicators. Ryan all that apply:    [  == ] Hypokalemia   [   ] Other electrolyte disturbance (please specify): _______   [   ]  Clinically Undetermined       Please document in your progress notes daily for the duration of treatment until resolved, and include in your discharge summary.

## 2019-03-29 PROBLEM — D62 ACUTE BLOOD LOSS ANEMIA: Status: ACTIVE | Noted: 2019-03-29

## 2019-03-29 LAB
ANION GAP SERPL CALC-SCNC: 10 MMOL/L (ref 8–16)
BASOPHILS # BLD AUTO: 0.01 K/UL (ref 0–0.2)
BASOPHILS NFR BLD: 0.1 % (ref 0–1.9)
BUN SERPL-MCNC: 5 MG/DL (ref 6–20)
CALCIUM SERPL-MCNC: 8.2 MG/DL (ref 8.7–10.5)
CHLORIDE SERPL-SCNC: 102 MMOL/L (ref 95–110)
CO2 SERPL-SCNC: 21 MMOL/L (ref 23–29)
CREAT SERPL-MCNC: 0.8 MG/DL (ref 0.5–1.4)
DIFFERENTIAL METHOD: ABNORMAL
EOSINOPHIL # BLD AUTO: 0.1 K/UL (ref 0–0.5)
EOSINOPHIL NFR BLD: 0.5 % (ref 0–8)
ERYTHROCYTE [DISTWIDTH] IN BLOOD BY AUTOMATED COUNT: 13.5 % (ref 11.5–14.5)
EST. GFR  (AFRICAN AMERICAN): >60 ML/MIN/1.73 M^2
EST. GFR  (NON AFRICAN AMERICAN): >60 ML/MIN/1.73 M^2
GLUCOSE SERPL-MCNC: 76 MG/DL (ref 70–110)
HCT VFR BLD AUTO: 25.1 % (ref 37–48.5)
HGB BLD-MCNC: 8.7 G/DL (ref 12–16)
LYMPHOCYTES # BLD AUTO: 1.7 K/UL (ref 1–4.8)
LYMPHOCYTES NFR BLD: 12.8 % (ref 18–48)
MCH RBC QN AUTO: 28.7 PG (ref 27–31)
MCHC RBC AUTO-ENTMCNC: 34.7 G/DL (ref 32–36)
MCV RBC AUTO: 83 FL (ref 82–98)
MONOCYTES # BLD AUTO: 1.4 K/UL (ref 0.3–1)
MONOCYTES NFR BLD: 10.3 % (ref 4–15)
NEUTROPHILS # BLD AUTO: 10.2 K/UL (ref 1.8–7.7)
NEUTROPHILS NFR BLD: 76.3 % (ref 38–73)
PLATELET # BLD AUTO: 353 K/UL (ref 150–350)
PMV BLD AUTO: 9.9 FL (ref 9.2–12.9)
POTASSIUM SERPL-SCNC: 3 MMOL/L (ref 3.5–5.1)
RBC # BLD AUTO: 3.03 M/UL (ref 4–5.4)
SODIUM SERPL-SCNC: 133 MMOL/L (ref 136–145)
WBC # BLD AUTO: 13.33 K/UL (ref 3.9–12.7)

## 2019-03-29 PROCEDURE — 85025 COMPLETE CBC W/AUTO DIFF WBC: CPT

## 2019-03-29 PROCEDURE — 80048 BASIC METABOLIC PNL TOTAL CA: CPT

## 2019-03-29 PROCEDURE — 11000001 HC ACUTE MED/SURG PRIVATE ROOM

## 2019-03-29 PROCEDURE — 25000003 PHARM REV CODE 250: Performed by: OBSTETRICS & GYNECOLOGY

## 2019-03-29 PROCEDURE — 36415 COLL VENOUS BLD VENIPUNCTURE: CPT

## 2019-03-29 PROCEDURE — 63600175 PHARM REV CODE 636 W HCPCS: Performed by: OBSTETRICS & GYNECOLOGY

## 2019-03-29 RX ORDER — FERROUS SULFATE 325(65) MG
325 TABLET, DELAYED RELEASE (ENTERIC COATED) ORAL 2 TIMES DAILY
Status: DISCONTINUED | OUTPATIENT
Start: 2019-03-29 | End: 2019-03-30 | Stop reason: HOSPADM

## 2019-03-29 RX ADMIN — IBUPROFEN 800 MG: 800 TABLET ORAL at 10:03

## 2019-03-29 RX ADMIN — FERROUS SULFATE TAB EC 325 MG (65 MG FE EQUIVALENT) 325 MG: 325 (65 FE) TABLET DELAYED RESPONSE at 09:03

## 2019-03-29 RX ADMIN — KETOROLAC TROMETHAMINE 30 MG: 30 INJECTION, SOLUTION INTRAMUSCULAR; INTRAVENOUS at 12:03

## 2019-03-29 RX ADMIN — IBUPROFEN 800 MG: 800 TABLET ORAL at 04:03

## 2019-03-29 RX ADMIN — HYDROCODONE BITARTRATE AND ACETAMINOPHEN 1 TABLET: 5; 325 TABLET ORAL at 04:03

## 2019-03-29 RX ADMIN — KETOROLAC TROMETHAMINE 30 MG: 30 INJECTION, SOLUTION INTRAMUSCULAR; INTRAVENOUS at 05:03

## 2019-03-29 RX ADMIN — FERROUS SULFATE TAB EC 325 MG (65 MG FE EQUIVALENT) 325 MG: 325 (65 FE) TABLET DELAYED RESPONSE at 08:03

## 2019-03-29 RX ADMIN — POTASSIUM CHLORIDE 25.07 MEQ: 20 SOLUTION ORAL at 09:03

## 2019-03-29 RX ADMIN — HYDROCODONE BITARTRATE AND ACETAMINOPHEN 1 TABLET: 5; 325 TABLET ORAL at 10:03

## 2019-03-29 RX ADMIN — DOCUSATE SODIUM 100 MG: 100 CAPSULE, LIQUID FILLED ORAL at 09:03

## 2019-03-29 RX ADMIN — POTASSIUM CHLORIDE 25.07 MEQ: 20 SOLUTION ORAL at 08:03

## 2019-03-29 RX ADMIN — HYDROCODONE BITARTRATE AND ACETAMINOPHEN 1 TABLET: 10; 325 TABLET ORAL at 11:03

## 2019-03-29 RX ADMIN — NIFEDIPINE 30 MG: 30 TABLET, FILM COATED, EXTENDED RELEASE ORAL at 09:03

## 2019-03-29 NOTE — PLAN OF CARE
Problem: Adult Inpatient Plan of Care  Goal: Plan of Care Review  Outcome: Ongoing (interventions implemented as appropriate)  Patient appears to be progressing well, VSS. Fundus is firm and bleeding is light to moderate. Chopra catheter removed at 2330; she is due to void. Walks independently with dizziness. Pain is controlled with oral medication. Bonds well with infant. Will continue to monitor.

## 2019-03-29 NOTE — SUBJECTIVE & OBJECTIVE
"Hospital course: Admit pre e pitocin IOL     Patient with preeclampsia on admit.  Underwent induction of labor.  Failure to progress beyond 7 cm.  She underwent a primary LTCS, and delivered a viable female infant (baby "Christofer").  She and the baby were transferred to [facility] for postpartum care.  Procardia 30 XL daily for blood pressure control.   Hypokalemia noted on admission.  Potassium was replaced intravenously initially, and improved.  Patient switched to po potassium replacement.    Post-op course was complicated by incisional bleeding that resolved with a pressure dressing.  She also developed asymptomatic anemia.  Oral iron supplement started.    Interval History:     She is doing well this morning. She is tolerating a regular diet without nausea or vomiting. She is voiding spontaneously. She is ambulating. She has not passed flatus, and has not a BM. Vaginal bleeding is mild. She denies fever or chills. Abdominal pain is mild and controlled with oral medications. She is breastfeeding. She desires circumcision for her male baby: not applicable.    Objective:     Vital Signs (Most Recent):  Temp: 97.5 °F (36.4 °C) (03/29/19 0400)  Pulse: 97 (03/29/19 0400)  Resp: 18 (03/29/19 0400)  BP: 116/69 (03/29/19 0400)  SpO2: 99 % (03/28/19 1300) Vital Signs (24h Range):  Temp:  [97.4 °F (36.3 °C)-99.4 °F (37.4 °C)] 97.5 °F (36.4 °C)  Pulse:  [] 97  Resp:  [18-20] 18  SpO2:  [99 %-100 %] 99 %  BP: (110-196)/() 116/69        There is no height or weight on file to calculate BMI.      Intake/Output Summary (Last 24 hours) at 3/29/2019 0713  Last data filed at 3/29/2019 0600  Gross per 24 hour   Intake 1600 ml   Output 2108 ml   Net -508 ml       Significant Labs:  Lab Results   Component Value Date    GROUPTRH B POS 03/27/2019    HEPBSAG Negative 08/08/2018    STREPBCULT No Group B Streptococcus isolated 02/21/2019     Recent Labs   Lab 03/29/19  0614   HGB 8.7*   HCT 25.1*       Recent Lab Results       " 03/29/19  0614   03/28/19  1858        Anion Gap 10 11     Baso # 0.01 0.01     Basophil% 0.1 0.1     BUN, Bld 5 4     Calcium 8.2 8.3     Chloride 102 103     CO2 21 21     Creatinine 0.8 0.8     Differential Method Automated Automated     eGFR if  >60 >60     eGFR if non  >60  Comment:  Calculation used to obtain the estimated glomerular filtration  rate (eGFR) is the CKD-EPI equation.    >60  Comment:  Calculation used to obtain the estimated glomerular filtration  rate (eGFR) is the CKD-EPI equation.        Eos # 0.1 0.0     Eosinophil% 0.5 0.0     Glucose 76 117     Gran # (ANC) 10.2 14.3     Gran% 76.3 82.1     Hematocrit 25.1 29.8     Hemoglobin 8.7 10.1     Lymph # 1.7 1.3     Lymph% 12.8 7.4     MCH 28.7 28.5     MCHC 34.7 33.9     MCV 83 84     Mono # 1.4 1.8     Mono% 10.3 10.4     MPV 9.9 10.1     Platelets 353 403     Potassium 3.0 3.2     RBC 3.03 3.54     RDW 13.5 13.6     Sodium 133 135     WBC 13.33 17.35           Physical Exam:   Constitutional: She is oriented to person, place, and time. She appears well-developed and well-nourished. No distress.       Cardiovascular: Normal rate.  Exam reveals no clubbing and no cyanosis.     Pulmonary/Chest: Effort normal.        Abdominal: Soft. She exhibits abdominal incision (Aquasel dressing that was replaced yesterday is now clean, dry, and intact). She exhibits no distension and no mass. There is no tenderness. There is no rebound and no guarding.   Uterine fundus firm, below the umbilicus, and non-tender             Musculoskeletal: She exhibits edema (2+ BL LE edema).       Neurological: She is alert and oriented to person, place, and time.    Skin: No cyanosis. Nails show no clubbing.    Psychiatric: She has a normal mood and affect. Her behavior is normal. Judgment and thought content normal.

## 2019-03-29 NOTE — NURSING
Order given by Dr. Amita MD for a CBC and BMP to be collected in the morning. Will continue to monitor.

## 2019-03-29 NOTE — LACTATION NOTE
Mother states that she is pumping, and formula feeding her bay. Declines any assistance for now; contact number provided.

## 2019-03-29 NOTE — ASSESSMENT & PLAN NOTE
POD#1 s/p primary LTCS for arrest of dilation.  Had significant incisional bleeding yesterday that required replacement of Aquasel dressing and new pressure dressing.  Pressure dressing has since been removed, and Aquasel is clean and dry with no evidence of active bleeding.  Continue to monitor.  Is otherwise doing well.  Continue routine post-op care.

## 2019-03-29 NOTE — PROGRESS NOTES
"[facility] -   Obstetrics  Postpartum Progress Note    Patient Name: Favian Hernandes  MRN: [id number]  Admission Date: 3/27/2019  Hospital Length of Stay: 2 days  Attending Physician: Thomas Branham MD  Primary Care Provider: Williams Deutsch MD    Subjective:     Principal Problem:Pre-eclampsia in third trimester    Hospital course: Admit pre e pitocin IOL     Patient with preeclampsia on admit.  Underwent induction of labor.  Failure to progress beyond 7 cm.  She underwent a primary LTCS, and delivered a viable female infant (baby "Christofer").  She and the baby were transferred to [facility] for postpartum care.  Procardia 30 XL daily for blood pressure control.   Hypokalemia noted on admission.  Potassium was replaced intravenously initially, and improved.  Patient switched to po potassium replacement.    Post-op course was complicated by incisional bleeding that resolved with a pressure dressing.  She also developed asymptomatic anemia.  Oral iron supplement started.    Interval History:     She is doing well this morning. She is tolerating a regular diet without nausea or vomiting. She is voiding spontaneously. She is ambulating. She has not passed flatus, and has not a BM. Vaginal bleeding is mild. She denies fever or chills. Abdominal pain is mild and controlled with oral medications. She is breastfeeding. She desires circumcision for her male baby: not applicable.    Objective:     Vital Signs (Most Recent):  Temp: 97.5 °F (36.4 °C) (03/29/19 0400)  Pulse: 97 (03/29/19 0400)  Resp: 18 (03/29/19 0400)  BP: 116/69 (03/29/19 0400)  SpO2: 99 % (03/28/19 1300) Vital Signs (24h Range):  Temp:  [97.4 °F (36.3 °C)-99.4 °F (37.4 °C)] 97.5 °F (36.4 °C)  Pulse:  [] 97  Resp:  [18-20] 18  SpO2:  [99 %-100 %] 99 %  BP: (110-196)/() 116/69        There is no height or weight on file to calculate BMI.      Intake/Output Summary (Last 24 hours) at 3/29/2019 0713  Last data filed at 3/29/2019 0600  Gross " per 24 hour   Intake 1600 ml   Output 2108 ml   Net -508 ml       Significant Labs:  Lab Results   Component Value Date    GROUPTRH B POS 03/27/2019    HEPBSAG Negative 08/08/2018    STREPBCULT No Group B Streptococcus isolated 02/21/2019     Recent Labs   Lab 03/29/19  0614   HGB 8.7*   HCT 25.1*       Recent Lab Results       03/29/19  0614   03/28/19  1858        Anion Gap 10 11     Baso # 0.01 0.01     Basophil% 0.1 0.1     BUN, Bld 5 4     Calcium 8.2 8.3     Chloride 102 103     CO2 21 21     Creatinine 0.8 0.8     Differential Method Automated Automated     eGFR if  >60 >60     eGFR if non  >60  Comment:  Calculation used to obtain the estimated glomerular filtration  rate (eGFR) is the CKD-EPI equation.    >60  Comment:  Calculation used to obtain the estimated glomerular filtration  rate (eGFR) is the CKD-EPI equation.        Eos # 0.1 0.0     Eosinophil% 0.5 0.0     Glucose 76 117     Gran # (ANC) 10.2 14.3     Gran% 76.3 82.1     Hematocrit 25.1 29.8     Hemoglobin 8.7 10.1     Lymph # 1.7 1.3     Lymph% 12.8 7.4     MCH 28.7 28.5     MCHC 34.7 33.9     MCV 83 84     Mono # 1.4 1.8     Mono% 10.3 10.4     MPV 9.9 10.1     Platelets 353 403     Potassium 3.0 3.2     RBC 3.03 3.54     RDW 13.5 13.6     Sodium 133 135     WBC 13.33 17.35           Physical Exam:   Constitutional: She is oriented to person, place, and time. She appears well-developed and well-nourished. No distress.       Cardiovascular: Normal rate.  Exam reveals no clubbing and no cyanosis.     Pulmonary/Chest: Effort normal.        Abdominal: Soft. She exhibits abdominal incision (Aquasel dressing that was replaced yesterday is now clean, dry, and intact). She exhibits no distension and no mass. There is no tenderness. There is no rebound and no guarding.   Uterine fundus firm, below the umbilicus, and non-tender             Musculoskeletal: She exhibits edema (2+ BL LE edema).       Neurological: She is  alert and oriented to person, place, and time.    Skin: No cyanosis. Nails show no clubbing.    Psychiatric: She has a normal mood and affect. Her behavior is normal. Judgment and thought content normal.       Assessment/Plan:     38 y.o. female  for:    * Pre-eclampsia in third trimester  Blood pressure now well-controlled with Procardia 30 XL daily.  Continue close surveillance    Acute blood loss anemia  Asymptomatic.  Start oral iron.  Repeat CBC in the AM.    Status post primary low transverse  section  POD#1 s/p primary LTCS for arrest of dilation.  Had significant incisional bleeding yesterday that required replacement of Aquasel dressing and new pressure dressing.  Pressure dressing has since been removed, and Aquasel is clean and dry with no evidence of active bleeding.  Continue to monitor.  Is otherwise doing well.  Continue routine post-op care.    Hypokalemia  Currently 3.0 today.  Continue po potassium replacement.  Repeat BMP in the AM.    Cephalopelvic disproportion due to mixed maternal and fetal factors  Patient with arrest of dilation in the active phase of labor with no significant cervical change in over 4 hours with adequate uterine contractions.  Discussed primary  delivery, and the patient and her significant other agree with  delivery.  Consents were reviewed in detail and signed.  All questions answered.  Proceed with primary LTCS for arrest of dilation.        Disposition: As patient meets milestones, will plan to discharge in 1-2 days.    Ying Levi MD  Obstetrics  [facility]

## 2019-03-30 ENCOUNTER — PATIENT MESSAGE (OUTPATIENT)
Dept: OBSTETRICS AND GYNECOLOGY | Facility: CLINIC | Age: 39
End: 2019-03-30

## 2019-03-30 VITALS
SYSTOLIC BLOOD PRESSURE: 144 MMHG | TEMPERATURE: 98 F | RESPIRATION RATE: 16 BRPM | OXYGEN SATURATION: 99 % | HEART RATE: 107 BPM | DIASTOLIC BLOOD PRESSURE: 88 MMHG

## 2019-03-30 LAB
ANION GAP SERPL CALC-SCNC: 9 MMOL/L (ref 8–16)
BASOPHILS # BLD AUTO: 0.01 K/UL (ref 0–0.2)
BASOPHILS NFR BLD: 0.1 % (ref 0–1.9)
BUN SERPL-MCNC: 4 MG/DL (ref 6–20)
CALCIUM SERPL-MCNC: 8.3 MG/DL (ref 8.7–10.5)
CHLORIDE SERPL-SCNC: 105 MMOL/L (ref 95–110)
CO2 SERPL-SCNC: 24 MMOL/L (ref 23–29)
CREAT SERPL-MCNC: 0.7 MG/DL (ref 0.5–1.4)
DIFFERENTIAL METHOD: ABNORMAL
EOSINOPHIL # BLD AUTO: 0.1 K/UL (ref 0–0.5)
EOSINOPHIL NFR BLD: 1.1 % (ref 0–8)
ERYTHROCYTE [DISTWIDTH] IN BLOOD BY AUTOMATED COUNT: 13.7 % (ref 11.5–14.5)
EST. GFR  (AFRICAN AMERICAN): >60 ML/MIN/1.73 M^2
EST. GFR  (NON AFRICAN AMERICAN): >60 ML/MIN/1.73 M^2
GLUCOSE SERPL-MCNC: 89 MG/DL (ref 70–110)
HCT VFR BLD AUTO: 23.9 % (ref 37–48.5)
HGB BLD-MCNC: 8 G/DL (ref 12–16)
LYMPHOCYTES # BLD AUTO: 1.7 K/UL (ref 1–4.8)
LYMPHOCYTES NFR BLD: 16.5 % (ref 18–48)
MCH RBC QN AUTO: 28.1 PG (ref 27–31)
MCHC RBC AUTO-ENTMCNC: 33.5 G/DL (ref 32–36)
MCV RBC AUTO: 84 FL (ref 82–98)
MONOCYTES # BLD AUTO: 0.8 K/UL (ref 0.3–1)
MONOCYTES NFR BLD: 7.3 % (ref 4–15)
NEUTROPHILS # BLD AUTO: 7.9 K/UL (ref 1.8–7.7)
NEUTROPHILS NFR BLD: 75 % (ref 38–73)
PLATELET # BLD AUTO: 392 K/UL (ref 150–350)
PMV BLD AUTO: 9.8 FL (ref 9.2–12.9)
POTASSIUM SERPL-SCNC: 3.1 MMOL/L (ref 3.5–5.1)
RBC # BLD AUTO: 2.85 M/UL (ref 4–5.4)
SODIUM SERPL-SCNC: 138 MMOL/L (ref 136–145)
WBC # BLD AUTO: 10.45 K/UL (ref 3.9–12.7)

## 2019-03-30 PROCEDURE — 85025 COMPLETE CBC W/AUTO DIFF WBC: CPT

## 2019-03-30 PROCEDURE — 99024 PR POST-OP FOLLOW-UP VISIT: ICD-10-PCS | Mod: ,,, | Performed by: OBSTETRICS & GYNECOLOGY

## 2019-03-30 PROCEDURE — 99024 POSTOP FOLLOW-UP VISIT: CPT | Mod: ,,, | Performed by: OBSTETRICS & GYNECOLOGY

## 2019-03-30 PROCEDURE — 80048 BASIC METABOLIC PNL TOTAL CA: CPT

## 2019-03-30 PROCEDURE — 36415 COLL VENOUS BLD VENIPUNCTURE: CPT

## 2019-03-30 PROCEDURE — 25000003 PHARM REV CODE 250: Performed by: OBSTETRICS & GYNECOLOGY

## 2019-03-30 RX ORDER — FERROUS SULFATE 325(65) MG
325 TABLET ORAL 2 TIMES DAILY
Qty: 60 TABLET | Refills: 3 | Status: SHIPPED | OUTPATIENT
Start: 2019-03-30 | End: 2020-03-29

## 2019-03-30 RX ORDER — HYDROCODONE BITARTRATE AND ACETAMINOPHEN 5; 325 MG/1; MG/1
1 TABLET ORAL EVERY 6 HOURS PRN
Qty: 15 TABLET | Refills: 0 | Status: SHIPPED | OUTPATIENT
Start: 2019-03-30 | End: 2019-04-18

## 2019-03-30 RX ORDER — POTASSIUM CHLORIDE 20 MEQ/1
20 TABLET, EXTENDED RELEASE ORAL 2 TIMES DAILY
Qty: 14 TABLET | Refills: 0 | Status: SHIPPED | OUTPATIENT
Start: 2019-03-30 | End: 2019-04-06

## 2019-03-30 RX ORDER — IBUPROFEN 800 MG/1
800 TABLET ORAL EVERY 8 HOURS PRN
Qty: 30 TABLET | Refills: 1 | Status: SHIPPED | OUTPATIENT
Start: 2019-03-30 | End: 2019-04-18

## 2019-03-30 RX ORDER — NIFEDIPINE 30 MG/1
30 TABLET, EXTENDED RELEASE ORAL DAILY
Qty: 30 TABLET | Refills: 11 | Status: SHIPPED | OUTPATIENT
Start: 2019-03-31 | End: 2019-04-04 | Stop reason: CLARIF

## 2019-03-30 RX ADMIN — NIFEDIPINE 30 MG: 30 TABLET, FILM COATED, EXTENDED RELEASE ORAL at 08:03

## 2019-03-30 RX ADMIN — FERROUS SULFATE TAB EC 325 MG (65 MG FE EQUIVALENT) 325 MG: 325 (65 FE) TABLET DELAYED RESPONSE at 08:03

## 2019-03-30 RX ADMIN — IBUPROFEN 800 MG: 800 TABLET ORAL at 05:03

## 2019-03-30 RX ADMIN — POTASSIUM CHLORIDE 25.07 MEQ: 20 SOLUTION ORAL at 08:03

## 2019-03-30 RX ADMIN — DOCUSATE SODIUM 100 MG: 100 CAPSULE, LIQUID FILLED ORAL at 08:03

## 2019-03-30 NOTE — SUBJECTIVE & OBJECTIVE
"Hospital course: Admit pre e pitocin IOL     Patient with preeclampsia on admit.  Underwent induction of labor.  Failure to progress beyond 7 cm.  She underwent a primary LTCS, and delivered a viable female infant (baby "Ryleigh").  She and the baby were transferred to [facility] for postpartum care.  Procardia 30 XL daily for blood pressure control.   Hypokalemia noted on admission.  Potassium was replaced intravenously initially, and improved.  Patient switched to po potassium replacement.    Post-op course was complicated by incisional bleeding that resolved with a pressure dressing.  She also developed asymptomatic anemia.  Oral iron supplement started.  Hb/Hct stabilized at 8.0/23.9, and patient remains asymptomatic with no evidence of active bleeding.  Continue iron outpatient.  Blood pressure stable on procardia 30 XL daily throughout her postpartum course.  Blood pressure check in 1 week.    Interval History:     She is doing well this morning. She is tolerating a regular diet without nausea or vomiting. She is voiding spontaneously. She is ambulating. She has passed flatus, and has not a BM. Vaginal bleeding is mild. She denies fever or chills. Abdominal pain is mild and controlled with oral medications. She is breastfeeding. She desires circumcision for her male baby: not applicable.    Objective:     Vital Signs (Most Recent):  Temp: 97.8 °F (36.6 °C) (03/30/19 0800)  Pulse: 103 (03/30/19 0800)  Resp: 20 (03/30/19 0800)  BP: 139/84 (03/30/19 0800)  SpO2: 99 % (03/28/19 1300) Vital Signs (24h Range):  Temp:  [97.8 °F (36.6 °C)-98.1 °F (36.7 °C)] 97.8 °F (36.6 °C)  Pulse:  [101-115] 103  Resp:  [16-20] 20  BP: (119-139)/(69-84) 139/84        There is no height or weight on file to calculate BMI.    No intake or output data in the 24 hours ending 03/30/19 0910    Significant Labs:  Lab Results   Component Value Date    GROUPTRH B POS 03/27/2019    HEPBSAG Negative 08/08/2018    STREPBCULT No Group B Streptococcus " isolated 02/21/2019     Recent Labs   Lab 03/30/19  0612   HGB 8.0*   HCT 23.9*       Recent Lab Results       03/30/19  0612        Anion Gap 9     Baso # 0.01     Basophil% 0.1     BUN, Bld 4     Calcium 8.3     Chloride 105     CO2 24     Creatinine 0.7     Differential Method Automated     eGFR if  >60     eGFR if non  >60  Comment:  Calculation used to obtain the estimated glomerular filtration  rate (eGFR) is the CKD-EPI equation.        Eos # 0.1     Eosinophil% 1.1     Glucose 89     Gran # (ANC) 7.9     Gran% 75.0     Hematocrit 23.9     Hemoglobin 8.0     Lymph # 1.7     Lymph% 16.5     MCH 28.1     MCHC 33.5     MCV 84     Mono # 0.8     Mono% 7.3     MPV 9.8     Platelets 392     Potassium 3.1     RBC 2.85     RDW 13.7     Sodium 138     WBC 10.45           Physical Exam:   Constitutional: She is oriented to person, place, and time. She appears well-developed and well-nourished. No distress.       Cardiovascular: Normal rate.  Exam reveals no clubbing and no cyanosis.     Pulmonary/Chest: Effort normal.        Abdominal: Soft. She exhibits abdominal incision (Aquasel dressing clean, dry, and intact). She exhibits no distension and no mass. There is no tenderness. There is no rebound and no guarding.   Uterine fundus firm, below the umbilicus, and non-tender             Musculoskeletal: She exhibits edema (2+ BL LE edema).       Neurological: She is alert and oriented to person, place, and time.    Skin: No cyanosis. Nails show no clubbing.    Psychiatric: She has a normal mood and affect. Her behavior is normal. Judgment and thought content normal.

## 2019-03-30 NOTE — DISCHARGE SUMMARY
"[facility] -   Obstetrics  Discharge Summary      Patient Name: Favian Hernandes  MRN: [id number]  Admission Date: 3/27/2019  Hospital Length of Stay: 3 days  Discharge Date and Time:  2019 9:18 AM  Attending Physician: Thomas Branham MD   Discharging Provider: Ying Levi MD  Primary Care Provider: Williams Deutsch MD    HPI: Sent from clinic for elevated bp admit IOL     Procedure(s) (LRB):   SECTION (N/A)     Hospital Course:   Admit pre e pitocin IOL     Patient with preeclampsia on admit.  Underwent induction of labor.  Failure to progress beyond 7 cm.  She underwent a primary LTCS, and delivered a viable female infant (baby "Ryleigh").  She and the baby were transferred to [facility] for postpartum care.  Procardia 30 XL daily for blood pressure control.   Hypokalemia noted on admission.  Potassium was replaced intravenously initially, and improved.  Patient switched to po potassium replacement.    Post-op course was complicated by incisional bleeding that resolved with a pressure dressing.  She also developed asymptomatic anemia.  Oral iron supplement started.  Hb/Hct stabilized at 8.0/23.9, and patient remains asymptomatic with no evidence of active bleeding.  Continue iron outpatient.  Blood pressure stable on procardia 30 XL daily throughout her postpartum course.  Blood pressure check in 1 week.    Consults (From admission, onward)        Status Ordering Provider     Consult to Lactation  Use PRN     Provider:  (Not yet assigned)    YING Sandoval          Final Active Diagnoses:    Diagnosis Date Noted POA    PRINCIPAL PROBLEM:  Pre-eclampsia in third trimester [O14.93] 2019 Yes    Acute blood loss anemia [D62] 2019 No    Cephalopelvic disproportion due to mixed maternal and fetal factors [O33.4XX0] 2019 Yes    Hypokalemia [E87.6] 2019 Yes    Status post primary low transverse  section [Z98.891] 2019 Not Applicable    "   Problems Resolved During this Admission:    Diagnosis Date Noted Date Resolved POA    Encounter for elective induction of labor [Z34.90] 2019 Not Applicable    Gestational hypertension, third trimester [O13.3] 2019 Yes    Gestational hypertension [O13.9] 2019 Yes        Labs:   BMP:   Recent Labs   Lab 1914 19  0612   * 76 89   * 133* 138   K 3.2* 3.0* 3.1*    102 105   CO2 21* 21* 24   BUN 4* 5* 4*   CREATININE 0.8 0.8 0.7   CALCIUM 8.3* 8.2* 8.3*    and CBC   Recent Labs   Lab 19  0612   WBC 17.35* 13.33* 10.45   HGB 10.1* 8.7* 8.0*   HCT 29.8* 25.1* 23.9*   * 353* 392*       Feeding Method: breast    Immunizations     None          Delivery:    Episiotomy: None   Lacerations: None   Repair suture:     Repair # of packets: 9   Blood loss (ml): 0     Birth information:  YOB: 2017   Time of birth: 10:17 AM   Sex: female   Delivery type: , Low Transverse   Gestational Age: 40w0d    Delivery Clinician:      Other providers:       Additional  information:  Forceps:    Vacuum:    Breech:    Observed anomalies      Living?:           APGARS  One minute Five minutes Ten minutes   Skin color:         Heart rate:         Grimace:         Muscle tone:         Breathing:         Totals: 8  9        Placenta: Delivered:       appearance    Pending Diagnostic Studies:     None          Discharged Condition: good    Disposition: Home or Self Care    Follow Up:  Follow-up Information     OB GYN NURSE, TOM On 2019.    Why:  Dressing removal and BP check           Ying Levi MD In 4 weeks.    Specialties:  Obstetrics and Gynecology, Obstetrics  Why:  For post-op check  Contact information:  [address] [place] [address] DR Kalyan LOPEZ [id number]  [phone]                 Patient Instructions:      Diet Adult Regular     Lifting restrictions     Notify your  health care provider if you experience any of the following:  temperature >100.4     Notify your health care provider if you experience any of the following:  persistent nausea and vomiting or diarrhea     Notify your health care provider if you experience any of the following:  severe uncontrolled pain     Notify your health care provider if you experience any of the following:  redness, tenderness, or signs of infection (pain, swelling, redness, odor or green/yellow discharge around incision site)     Notify your health care provider if you experience any of the following:  difficulty breathing or increased cough     Notify your health care provider if you experience any of the following:  severe persistent headache     Notify your health care provider if you experience any of the following:  worsening rash     Notify your health care provider if you experience any of the following:  persistent dizziness, light-headedness, or visual disturbances     Notify your health care provider if you experience any of the following:  increased confusion or weakness     Notify your health care provider if you experience any of the following:     Leave dressing on - Keep it clean, dry, and intact until clinic visit     Medications:  Current Discharge Medication List      START taking these medications    Details   HYDROcodone-acetaminophen (NORCO) 5-325 mg per tablet Take 1 tablet by mouth every 6 (six) hours as needed for Pain.  Qty: 15 tablet, Refills: 0      ibuprofen (ADVIL,MOTRIN) 800 MG tablet Take 1 tablet (800 mg total) by mouth every 8 (eight) hours as needed for Pain.  Qty: 30 tablet, Refills: 1      NIFEdipine (PROCARDIA-XL) 30 MG (OSM) 24 hr tablet Take 1 tablet (30 mg total) by mouth once daily.  Qty: 30 tablet, Refills: 11      potassium chloride SA (K-DUR,KLOR-CON) 20 MEQ tablet Take 1 tablet (20 mEq total) by mouth 2 (two) times daily. for 7 days  Qty: 14 tablet, Refills: 0         CONTINUE these medications  which have CHANGED    Details   ferrous sulfate (FEOSOL) 325 mg (65 mg iron) Tab tablet Take 1 tablet (325 mg total) by mouth 2 (two) times daily.  Qty: 60 tablet, Refills: 3    Associated Diagnoses: Anemia during pregnancy         CONTINUE these medications which have NOT CHANGED    Details   prenatal 25/iron fum/folic/dha (PRENATAL-1 ORAL) Take 2 tablets by mouth once daily. 2 gummies/day             Ying Levi MD  Obstetrics  [facility] -

## 2019-03-30 NOTE — DISCHARGE INSTRUCTIONS

## 2019-03-30 NOTE — ASSESSMENT & PLAN NOTE
Asymptomatic with no significant drop in hemoglobin in the last 24 hours.  Continue oral iron supplementation.

## 2019-03-30 NOTE — PROGRESS NOTES
"[facility] -   Obstetrics  Postpartum Progress Note    Patient Name: Favian Hernandes  MRN: [id number]  Admission Date: 3/27/2019  Hospital Length of Stay: 3 days  Attending Physician: Thomas Branham MD  Primary Care Provider: Williams Deutsch MD    Subjective:     Principal Problem:Pre-eclampsia in third trimester    Hospital course: Admit pre e pitocin IOL     Patient with preeclampsia on admit.  Underwent induction of labor.  Failure to progress beyond 7 cm.  She underwent a primary LTCS, and delivered a viable female infant (baby "Ryleigh").  She and the baby were transferred to [facility] for postpartum care.  Procardia 30 XL daily for blood pressure control.   Hypokalemia noted on admission.  Potassium was replaced intravenously initially, and improved.  Patient switched to po potassium replacement.    Post-op course was complicated by incisional bleeding that resolved with a pressure dressing.  She also developed asymptomatic anemia.  Oral iron supplement started.  Hb/Hct stabilized at 8.0/23.9, and patient remains asymptomatic with no evidence of active bleeding.  Continue iron outpatient.  Blood pressure stable on procardia 30 XL daily throughout her postpartum course.  Blood pressure check in 1 week.    Interval History:     She is doing well this morning. She is tolerating a regular diet without nausea or vomiting. She is voiding spontaneously. She is ambulating. She has passed flatus, and has not a BM. Vaginal bleeding is mild. She denies fever or chills. Abdominal pain is mild and controlled with oral medications. She is breastfeeding. She desires circumcision for her male baby: not applicable.    Objective:     Vital Signs (Most Recent):  Temp: 97.8 °F (36.6 °C) (03/30/19 0800)  Pulse: 103 (03/30/19 0800)  Resp: 20 (03/30/19 0800)  BP: 139/84 (03/30/19 0800)  SpO2: 99 % (03/28/19 1300) Vital Signs (24h Range):  Temp:  [97.8 °F (36.6 °C)-98.1 °F (36.7 °C)] 97.8 °F (36.6 °C)  Pulse:  [101-115] " 103  Resp:  [16-20] 20  BP: (119-139)/(69-84) 139/84        There is no height or weight on file to calculate BMI.    No intake or output data in the 24 hours ending 03/30/19 0910    Significant Labs:  Lab Results   Component Value Date    GROUPTRH B POS 03/27/2019    HEPBSAG Negative 08/08/2018    STREPBCULT No Group B Streptococcus isolated 02/21/2019     Recent Labs   Lab 03/30/19  0612   HGB 8.0*   HCT 23.9*       Recent Lab Results       03/30/19  0612        Anion Gap 9     Baso # 0.01     Basophil% 0.1     BUN, Bld 4     Calcium 8.3     Chloride 105     CO2 24     Creatinine 0.7     Differential Method Automated     eGFR if  >60     eGFR if non  >60  Comment:  Calculation used to obtain the estimated glomerular filtration  rate (eGFR) is the CKD-EPI equation.        Eos # 0.1     Eosinophil% 1.1     Glucose 89     Gran # (ANC) 7.9     Gran% 75.0     Hematocrit 23.9     Hemoglobin 8.0     Lymph # 1.7     Lymph% 16.5     MCH 28.1     MCHC 33.5     MCV 84     Mono # 0.8     Mono% 7.3     MPV 9.8     Platelets 392     Potassium 3.1     RBC 2.85     RDW 13.7     Sodium 138     WBC 10.45           Physical Exam:   Constitutional: She is oriented to person, place, and time. She appears well-developed and well-nourished. No distress.       Cardiovascular: Normal rate.  Exam reveals no clubbing and no cyanosis.     Pulmonary/Chest: Effort normal.        Abdominal: Soft. She exhibits abdominal incision (Aquasel dressing clean, dry, and intact). She exhibits no distension and no mass. There is no tenderness. There is no rebound and no guarding.   Uterine fundus firm, below the umbilicus, and non-tender             Musculoskeletal: She exhibits edema (2+ BL LE edema).       Neurological: She is alert and oriented to person, place, and time.    Skin: No cyanosis. Nails show no clubbing.    Psychiatric: She has a normal mood and affect. Her behavior is normal. Judgment and thought content  normal.       Assessment/Plan:     38 y.o. female  for:    * Pre-eclampsia in third trimester  Blood pressure well-controlled with Procardia 30 XL daily.  Stable for discharge to home with blood pressure check in clinic next week.    Acute blood loss anemia  Asymptomatic with no significant drop in hemoglobin in the last 24 hours.  Continue oral iron supplementation.    Status post primary low transverse  section  POD#1 s/p primary LTCS for arrest of dilation.  Had significant incisional bleeding yesterday that required replacement of Aquasel dressing and new pressure dressing.  Pressure dressing has since been removed, and Aquasel is clean and dry with no evidence of active bleeding.  Continue to monitor.  Is otherwise doing well.  Continue routine post-op care.    2019 POD#2.  Doing well.  Aquasel dressing is dry with no evidence of incisional bleeding.  Patient desires discharge to home today, and meets criteria for discharge.  Hand hygiene and wound care discussed.    Hypokalemia  Currently 3.1 today.  Continue oral potassium replacement outpatient.    Cephalopelvic disproportion due to mixed maternal and fetal factors  Patient with arrest of dilation in the active phase of labor with no significant cervical change in over 4 hours with adequate uterine contractions.  Discussed primary  delivery, and the patient and her significant other agree with  delivery.  Consents were reviewed in detail and signed.  All questions answered.  Proceed with primary LTCS for arrest of dilation.        Disposition: As patient meets milestones, will plan to discharge today.    Ying Levi MD  Obstetrics  [facility] -

## 2019-03-30 NOTE — LACTATION NOTE
Mother denies any further lactation needs or concerns at this time. Mother anticipates discharge home today.Lactation discharge information reviewed.  Mother is aware of warm line and outpatient consultations. Encouraged mother to contact lactation with any questions, concerns, or problems. Contact numbers provided, and mother verbalizes understanding.

## 2019-03-30 NOTE — ASSESSMENT & PLAN NOTE
POD#1 s/p primary LTCS for arrest of dilation.  Had significant incisional bleeding yesterday that required replacement of Aquasel dressing and new pressure dressing.  Pressure dressing has since been removed, and Aquasel is clean and dry with no evidence of active bleeding.  Continue to monitor.  Is otherwise doing well.  Continue routine post-op care.    03/30/2019 POD#2.  Doing well.  Aquasel dressing is dry with no evidence of incisional bleeding.  Patient desires discharge to home today, and meets criteria for discharge.  Hand hygiene and wound care discussed.

## 2019-03-30 NOTE — NURSING
Discharge instructions read, discussed, and copy given to pt. Pt verbalized understanding. Pt tolerating pain with po meds. Pt denies any questions or concerns at this time. Pt in wheelchair with infant in arms. Pt escorted downstairs to personal vehicle for discharge by hospital employee.  All follow up instructions given. Pt adequate for D/C. Follow up OB appointment scheduled. VSS. NAD.

## 2019-03-30 NOTE — PLAN OF CARE
Problem: Adult Inpatient Plan of Care  Goal: Plan of Care Review  Outcome: Ongoing (interventions implemented as appropriate)  Pt afebrile and had no falls this shift. Fundus firm w/out massage and below umbilicus. Bleeding light, no clots passed this shift. Aquacel with scant amount of drainage. Voids spontaneously. Ambulates independently. Pain well controlled with oral pain medication. VSS at this time. Bonding well with infant; responds to infant cues and participates in infant care. Pump at bedside; educated to pump every 2-3 hours to maintain milk supply, verbalized understanding. Educated on safe formula preparation and administration. Written information also reviewed; both parents verbalized understanding. Will continue to monitor.

## 2019-03-30 NOTE — ASSESSMENT & PLAN NOTE
Blood pressure well-controlled with Procardia 30 XL daily.  Stable for discharge to home with blood pressure check in clinic next week.

## 2019-04-02 ENCOUNTER — PATIENT MESSAGE (OUTPATIENT)
Dept: OBSTETRICS AND GYNECOLOGY | Facility: CLINIC | Age: 39
End: 2019-04-02

## 2019-04-04 ENCOUNTER — CLINICAL SUPPORT (OUTPATIENT)
Dept: OBSTETRICS AND GYNECOLOGY | Facility: CLINIC | Age: 39
End: 2019-04-04
Payer: COMMERCIAL

## 2019-04-04 ENCOUNTER — PATIENT MESSAGE (OUTPATIENT)
Dept: OBSTETRICS AND GYNECOLOGY | Facility: CLINIC | Age: 39
End: 2019-04-04

## 2019-04-04 ENCOUNTER — LAB VISIT (OUTPATIENT)
Dept: LAB | Facility: HOSPITAL | Age: 39
End: 2019-04-04
Attending: OBSTETRICS & GYNECOLOGY
Payer: COMMERCIAL

## 2019-04-04 VITALS
BODY MASS INDEX: 49.99 KG/M2 | WEIGHT: 282.19 LBS | SYSTOLIC BLOOD PRESSURE: 158 MMHG | DIASTOLIC BLOOD PRESSURE: 102 MMHG

## 2019-04-04 DIAGNOSIS — Z98.891 STATUS POST PRIMARY LOW TRANSVERSE CESAREAN SECTION: Primary | ICD-10-CM

## 2019-04-04 DIAGNOSIS — O14.93 PRE-ECLAMPSIA IN THIRD TRIMESTER: ICD-10-CM

## 2019-04-04 LAB
ALBUMIN SERPL BCP-MCNC: 2.7 G/DL (ref 3.5–5.2)
ALP SERPL-CCNC: 127 U/L (ref 55–135)
ALT SERPL W/O P-5'-P-CCNC: 19 U/L (ref 10–44)
ANION GAP SERPL CALC-SCNC: 12 MMOL/L (ref 8–16)
AST SERPL-CCNC: 25 U/L (ref 10–40)
BILIRUB SERPL-MCNC: 0.4 MG/DL (ref 0.1–1)
BUN SERPL-MCNC: 4 MG/DL (ref 6–20)
CALCIUM SERPL-MCNC: 9.6 MG/DL (ref 8.7–10.5)
CHLORIDE SERPL-SCNC: 106 MMOL/L (ref 95–110)
CO2 SERPL-SCNC: 22 MMOL/L (ref 23–29)
CREAT SERPL-MCNC: 0.7 MG/DL (ref 0.5–1.4)
EST. GFR  (AFRICAN AMERICAN): >60 ML/MIN/1.73 M^2
EST. GFR  (NON AFRICAN AMERICAN): >60 ML/MIN/1.73 M^2
GLUCOSE SERPL-MCNC: 64 MG/DL (ref 70–110)
POTASSIUM SERPL-SCNC: 4 MMOL/L (ref 3.5–5.1)
PROT SERPL-MCNC: 7 G/DL (ref 6–8.4)
SODIUM SERPL-SCNC: 140 MMOL/L (ref 136–145)

## 2019-04-04 PROCEDURE — 36415 COLL VENOUS BLD VENIPUNCTURE: CPT | Mod: PO

## 2019-04-04 PROCEDURE — 99024 POSTOP FOLLOW-UP VISIT: CPT | Mod: S$GLB,,, | Performed by: OBSTETRICS & GYNECOLOGY

## 2019-04-04 PROCEDURE — 99999 PR PBB SHADOW E&M-EST. PATIENT-LVL II: ICD-10-PCS | Mod: PBBFAC,,,

## 2019-04-04 PROCEDURE — 80053 COMPREHEN METABOLIC PANEL: CPT

## 2019-04-04 PROCEDURE — 99024 PR POST-OP FOLLOW-UP VISIT: ICD-10-PCS | Mod: S$GLB,,, | Performed by: OBSTETRICS & GYNECOLOGY

## 2019-04-04 PROCEDURE — 99999 PR PBB SHADOW E&M-EST. PATIENT-LVL II: CPT | Mod: PBBFAC,,,

## 2019-04-04 RX ORDER — NIFEDIPINE 30 MG/1
30 TABLET, EXTENDED RELEASE ORAL 2 TIMES DAILY
Qty: 60 TABLET | Refills: 11 | Status: SHIPPED | OUTPATIENT
Start: 2019-04-04 | End: 2019-04-18 | Stop reason: SDUPTHER

## 2019-04-04 RX ORDER — FUROSEMIDE 40 MG/1
40 TABLET ORAL DAILY
Qty: 30 TABLET | Refills: 11 | Status: SHIPPED | OUTPATIENT
Start: 2019-04-04 | End: 2019-04-18

## 2019-04-04 NOTE — PROGRESS NOTES
Subjective:       Patient ID: Favian Hernandes is a 38 y.o. female.    Chief Complaint:  No chief complaint on file.      History of Present Illness  HPI  Postoperative Follow-up  Patient presents to the clinic 1 weeks status post  for failure to progess; . Pre Eclampsia; Eating a regular diet without difficulty. Bowel movements are normal. The patient is not having any pain.   Breast and bottle feeding  Infant doing great!!  Emotionally well  Taking procardia daily, iron bid, potassium bid    bp 166/108; 158/102; took bp med this am 1 hr prior to appt    Denies preE symptoms, denies chest pain, shortness of breath; denies feeling weak /dizzy    Feels lower legs still swollen, but better      GYN & OB History  Patient's last menstrual period was 2018 (exact date).   Date of Last Pap: 2017    OB History    Para Term  AB Living   3 2 1 1 1 1   SAB TAB Ectopic Multiple Live Births   1     0 2      # Outcome Date GA Lbr Gus/2nd Weight Sex Delivery Anes PTL Lv   3 Term 19 40w0d  3.31 kg (7 lb 4.8 oz) F CS-LTranv EPI N MAUREEN      Complications: Failure to Progress in First Stage   2 SAB            1  03 25w0d  0.652 kg (1 lb 7 oz) F Vag-Spont   ND       Review of Systems  Review of Systems   All other systems reviewed and are negative.          Objective:      Physical Exam:   Constitutional: She appears well-developed.     Eyes: Pupils are equal, round, and reactive to light. Conjunctivae and EOM are normal.    Neck: Normal range of motion. Neck supple.     Pulmonary/Chest: Effort normal.        Abdominal: Soft. She exhibits abdominal incision (clean dry intact, no errythema, exudate, induration).             Musculoskeletal: Normal range of motion.       Neurological: She is alert.    Skin: Skin is warm.    Psychiatric: She has a normal mood and affect.           Assessment:     Encounter Diagnoses   Name Primary?    Pre-eclampsia in third trimester      Status post primary low transverse  section Yes             Plan:      Continue post op course; pelvic rest  Procardia increased to 30 bid  cmp today  Lasix 40mg added  Repeat bp check in 4 days

## 2019-04-08 ENCOUNTER — CLINICAL SUPPORT (OUTPATIENT)
Dept: OBSTETRICS AND GYNECOLOGY | Facility: CLINIC | Age: 39
End: 2019-04-08
Payer: COMMERCIAL

## 2019-04-08 ENCOUNTER — TELEPHONE (OUTPATIENT)
Dept: OBSTETRICS AND GYNECOLOGY | Facility: CLINIC | Age: 39
End: 2019-04-08

## 2019-04-08 VITALS
DIASTOLIC BLOOD PRESSURE: 84 MMHG | SYSTOLIC BLOOD PRESSURE: 126 MMHG | HEIGHT: 63 IN | BODY MASS INDEX: 49.08 KG/M2 | WEIGHT: 277 LBS

## 2019-04-08 DIAGNOSIS — Z01.30 BLOOD PRESSURE CHECK: Primary | ICD-10-CM

## 2019-04-08 DIAGNOSIS — Z98.890 POST-OPERATIVE STATE: ICD-10-CM

## 2019-04-08 PROCEDURE — 99999 PR PBB SHADOW E&M-EST. PATIENT-LVL II: ICD-10-PCS | Mod: PBBFAC,,,

## 2019-04-08 PROCEDURE — 99999 PR PBB SHADOW E&M-EST. PATIENT-LVL II: CPT | Mod: PBBFAC,,,

## 2019-04-09 NOTE — PROGRESS NOTES
Pt presents today for a B/P check. Reading was 126/84. Pt had no complaints and was asymptomatic. Dr. Sparks was made aware and Pt tolerated procedure well. KENTON

## 2019-04-10 ENCOUNTER — TELEPHONE (OUTPATIENT)
Dept: OBSTETRICS AND GYNECOLOGY | Facility: CLINIC | Age: 39
End: 2019-04-10

## 2019-04-10 ENCOUNTER — PATIENT MESSAGE (OUTPATIENT)
Dept: OBSTETRICS AND GYNECOLOGY | Facility: CLINIC | Age: 39
End: 2019-04-10

## 2019-04-10 NOTE — TELEPHONE ENCOUNTER
Spoke to the pt. And advised her to stop taking lasix and resume with nifedipine 1 qd and we will recheck her BP at a nurse visit.  filemon Quinonez    I'm unable to get the NIFEdipine prescription filled. The pharmacist saw the increase in dosage from once a day to twice but they said my insurance won't fill it until the 28th. Is there another prescription I can take?

## 2019-04-15 ENCOUNTER — TELEPHONE (OUTPATIENT)
Dept: OBSTETRICS AND GYNECOLOGY | Facility: CLINIC | Age: 39
End: 2019-04-15

## 2019-04-15 NOTE — TELEPHONE ENCOUNTER
----- Message from Ioana Lucero sent at 4/15/2019  2:22 PM CDT -----  Contact: Allyson ([facility])  Caller is calling to verify the pt return to work date and the date the pt started being seen for this pregnancy . Please give Allyson a call at [phone], a detailed message can be left

## 2019-04-16 ENCOUNTER — TELEPHONE (OUTPATIENT)
Dept: OBSTETRICS AND GYNECOLOGY | Facility: CLINIC | Age: 39
End: 2019-04-16

## 2019-04-16 NOTE — TELEPHONE ENCOUNTER
----- Message from Andres Ingram sent at 4/16/2019 10:55 AM CDT -----  Contact: [facility]  Type:  Needs Medical Advice    Who Called: deann  direct line  Symptoms (please be specific):  How long has patient had these symptoms:   Pharmacy name and phone #:    Would the patient rather a call back or a response via [facility]? Call   Best Call Back Number: [phone]  Additional Information: caller is requesting a call back from the nurse in regards to her getting the expected arrival date and knowing when the pt was first treated for this pregnancy

## 2019-04-18 ENCOUNTER — TELEPHONE (OUTPATIENT)
Dept: OBSTETRICS AND GYNECOLOGY | Facility: CLINIC | Age: 39
End: 2019-04-18

## 2019-04-18 ENCOUNTER — OFFICE VISIT (OUTPATIENT)
Dept: OBSTETRICS AND GYNECOLOGY | Facility: CLINIC | Age: 39
End: 2019-04-18
Payer: COMMERCIAL

## 2019-04-18 VITALS
SYSTOLIC BLOOD PRESSURE: 136 MMHG | WEIGHT: 274 LBS | HEIGHT: 63 IN | DIASTOLIC BLOOD PRESSURE: 94 MMHG | BODY MASS INDEX: 48.55 KG/M2

## 2019-04-18 DIAGNOSIS — Z98.890 POSTOPERATIVE GRANULATION OF TISSUE: Primary | ICD-10-CM

## 2019-04-18 DIAGNOSIS — O14.93 PRE-ECLAMPSIA IN THIRD TRIMESTER: ICD-10-CM

## 2019-04-18 DIAGNOSIS — Z98.891 STATUS POST PRIMARY LOW TRANSVERSE CESAREAN SECTION: ICD-10-CM

## 2019-04-18 DIAGNOSIS — L92.9 POSTOPERATIVE GRANULATION OF TISSUE: Primary | ICD-10-CM

## 2019-04-18 PROCEDURE — 99999 PR PBB SHADOW E&M-EST. PATIENT-LVL III: CPT | Mod: PBBFAC,,, | Performed by: OBSTETRICS & GYNECOLOGY

## 2019-04-18 PROCEDURE — 99999 PR PBB SHADOW E&M-EST. PATIENT-LVL III: ICD-10-PCS | Mod: PBBFAC,,, | Performed by: OBSTETRICS & GYNECOLOGY

## 2019-04-18 PROCEDURE — 99024 POSTOP FOLLOW-UP VISIT: CPT | Mod: S$GLB,,, | Performed by: OBSTETRICS & GYNECOLOGY

## 2019-04-18 PROCEDURE — 99024 PR POST-OP FOLLOW-UP VISIT: ICD-10-PCS | Mod: S$GLB,,, | Performed by: OBSTETRICS & GYNECOLOGY

## 2019-04-18 RX ORDER — NIFEDIPINE 30 MG/1
30 TABLET, EXTENDED RELEASE ORAL 2 TIMES DAILY
Qty: 60 TABLET | Refills: 0 | Status: SHIPPED | OUTPATIENT
Start: 2019-04-18 | End: 2020-10-22

## 2019-04-18 NOTE — PROGRESS NOTES
Subjective:       Patient ID: Favian Hernandes is a 38 y.o. female.    Chief Complaint:  Wound Check and Hypertension      History of Present Illness  HPI  here for bp check and reports problem with incision   Pt reports no longer taking lasix  Took last dose of procardia yesterday  No bp meds this am;    Pt reports noticed increased tissue at incision  Denies headaches, blurry vision    GYN & OB History  Patient's last menstrual period was 2018 (exact date).   Date of Last Pap: 2017    OB History    Para Term  AB Living   3 2 1 1 1 1   SAB TAB Ectopic Multiple Live Births   1     0 2      # Outcome Date GA Lbr Gus/2nd Weight Sex Delivery Anes PTL Lv   3 Term 19 40w0d  3.31 kg (7 lb 4.8 oz) F CS-LTranv EPI N MAUREEN      Complications: Failure to Progress in First Stage   2 SAB            1  03 25w0d  0.652 kg (1 lb 7 oz) F Vag-Spont   ND       Review of Systems  Review of Systems   All other systems reviewed and are negative.          Objective:      Physical Exam:             Abdominal: She exhibits abdominal incision (granulation tissue present midline).                           Assessment:        1. Postoperative granulation of tissue    2. Status post primary low transverse  section               Plan:    continue procardia 30 bid  Continue low salt diet;    Silver nitrate applied to granulation tissue  Sharp dissection and removal of granulation tissue  Shallow defect noted--.5 cm deep; approx 4 cm wide  Advised keep area clean, dry  F/u 1 wk

## 2019-04-18 NOTE — TELEPHONE ENCOUNTER
----- Message from Blanca Murillo sent at 4/18/2019  9:43 AM CDT -----  Contact: Patient   Type:  Patient Returning Call    Who Called:Patient   Who Left Message for Patient:Ms. Estrella   Does the patient know what this is regarding?:Blood pressure medication  Would the patient rather a call back or a response via [facility]? call  Best Call Back Number:[phone]  Additional Information: n/a

## 2019-04-25 ENCOUNTER — CLINICAL SUPPORT (OUTPATIENT)
Dept: OBSTETRICS AND GYNECOLOGY | Facility: CLINIC | Age: 39
End: 2019-04-25
Payer: COMMERCIAL

## 2019-04-25 VITALS
WEIGHT: 272.5 LBS | DIASTOLIC BLOOD PRESSURE: 78 MMHG | BODY MASS INDEX: 48.28 KG/M2 | SYSTOLIC BLOOD PRESSURE: 128 MMHG | HEIGHT: 63 IN

## 2019-04-25 DIAGNOSIS — Z78.9 PRESENCE OF SURGICAL INCISION: Primary | ICD-10-CM

## 2019-04-25 PROCEDURE — 99999 PR PBB SHADOW E&M-EST. PATIENT-LVL III: CPT | Mod: PBBFAC,,,

## 2019-04-25 PROCEDURE — 99999 PR PBB SHADOW E&M-EST. PATIENT-LVL III: ICD-10-PCS | Mod: PBBFAC,,,

## 2019-04-25 NOTE — PROGRESS NOTES
Pt. Incision so her lower abdominal are was intact, no discharge, bleeding or tears noted.  Pt. Stated she has no problems or pain.  Advised pt. To continue to keep the area dry and  Lift nothing heavier than a car seat and shower above the waist and pat dry.  Pt. Acknowledged understanding. filemon Quinonez

## 2019-05-01 ENCOUNTER — POSTPARTUM VISIT (OUTPATIENT)
Dept: OBSTETRICS AND GYNECOLOGY | Facility: CLINIC | Age: 39
End: 2019-05-01
Payer: COMMERCIAL

## 2019-05-01 VITALS
HEIGHT: 63 IN | BODY MASS INDEX: 49.06 KG/M2 | SYSTOLIC BLOOD PRESSURE: 126 MMHG | DIASTOLIC BLOOD PRESSURE: 84 MMHG | WEIGHT: 276.88 LBS

## 2019-05-01 DIAGNOSIS — L92.9 POSTOPERATIVE GRANULATION OF TISSUE: ICD-10-CM

## 2019-05-01 DIAGNOSIS — Z98.890 POSTOPERATIVE GRANULATION OF TISSUE: ICD-10-CM

## 2019-05-01 PROCEDURE — 99999 PR PBB SHADOW E&M-EST. PATIENT-LVL II: ICD-10-PCS | Mod: PBBFAC,,, | Performed by: PHYSICIAN ASSISTANT

## 2019-05-01 PROCEDURE — 99024 PR POST-OP FOLLOW-UP VISIT: ICD-10-PCS | Mod: S$GLB,,, | Performed by: PHYSICIAN ASSISTANT

## 2019-05-01 PROCEDURE — 99999 PR PBB SHADOW E&M-EST. PATIENT-LVL II: CPT | Mod: PBBFAC,,, | Performed by: PHYSICIAN ASSISTANT

## 2019-05-01 PROCEDURE — 99024 POSTOP FOLLOW-UP VISIT: CPT | Mod: S$GLB,,, | Performed by: PHYSICIAN ASSISTANT

## 2019-05-01 NOTE — PROGRESS NOTES
Patient presents today for follow-up incisional check.  At her postpartum visit, she had granulation tissue removed by Dr. Sparks and silver nitrate applied topically. This visit was roughly 2 weeks ago and at today's visit her incision is completely well-healed and well-approximated.  Patients denies incisional pain, no drainage or odor.  She was instructed to continue daily showers for 2 more weeks, at which time she can take tub baths.

## 2019-07-01 PROBLEM — O14.93 PRE-ECLAMPSIA IN THIRD TRIMESTER: Status: RESOLVED | Noted: 2019-03-27 | Resolved: 2019-07-01

## 2019-09-16 PROBLEM — O09.299 H/O INCOMPETENT CERVIX, CURRENTLY PREGNANT: Status: RESOLVED | Noted: 2018-08-08 | Resolved: 2019-09-16

## 2019-09-20 ENCOUNTER — TELEPHONE (OUTPATIENT)
Dept: OBSTETRICS AND GYNECOLOGY | Facility: CLINIC | Age: 39
End: 2019-09-20

## 2019-09-20 NOTE — TELEPHONE ENCOUNTER
----- Message from Emelina Lopez sent at 9/20/2019 11:04 AM CDT -----  Contact: [facility]- Laure Kelsey needs to speak to someone about why patients leave has been extended, please call her back at [phone]. Thank you

## 2019-09-20 NOTE — TELEPHONE ENCOUNTER
Spoke with Laure at [facility], requesting why pt is needing longer than 8 weeks for maternity leave. Advised Laure that she medically only needed 8 weeks but has the right to 12 weeks. Verbalized understanding.

## 2019-10-03 ENCOUNTER — PATIENT MESSAGE (OUTPATIENT)
Dept: OBSTETRICS AND GYNECOLOGY | Facility: CLINIC | Age: 39
End: 2019-10-03

## 2019-10-07 ENCOUNTER — TELEPHONE (OUTPATIENT)
Dept: OBSTETRICS AND GYNECOLOGY | Facility: CLINIC | Age: 39
End: 2019-10-07

## 2019-10-07 NOTE — TELEPHONE ENCOUNTER
C/section was 3/28/19    Her final post partum check was on 5/1/2019    Her release to return to work was 8 wks or 12 wks    (it was not an extended recovery)    Not sure what she is asking, please call the patient to see what she needs

## 2020-10-22 ENCOUNTER — PROCEDURE VISIT (OUTPATIENT)
Dept: OBSTETRICS AND GYNECOLOGY | Facility: CLINIC | Age: 40
End: 2020-10-22
Payer: COMMERCIAL

## 2020-10-22 ENCOUNTER — OFFICE VISIT (OUTPATIENT)
Dept: OBSTETRICS AND GYNECOLOGY | Facility: CLINIC | Age: 40
End: 2020-10-22
Payer: COMMERCIAL

## 2020-10-22 VITALS
SYSTOLIC BLOOD PRESSURE: 118 MMHG | WEIGHT: 293 LBS | BODY MASS INDEX: 51.91 KG/M2 | DIASTOLIC BLOOD PRESSURE: 66 MMHG | HEIGHT: 63 IN

## 2020-10-22 DIAGNOSIS — Z01.419 ENCOUNTER FOR GYNECOLOGICAL EXAMINATION (GENERAL) (ROUTINE) WITHOUT ABNORMAL FINDINGS: Primary | ICD-10-CM

## 2020-10-22 DIAGNOSIS — Z30.011 ENCOUNTER FOR INITIAL PRESCRIPTION OF CONTRACEPTIVE PILLS: ICD-10-CM

## 2020-10-22 DIAGNOSIS — R10.2 PELVIC PAIN IN FEMALE: ICD-10-CM

## 2020-10-22 DIAGNOSIS — Z12.4 SCREENING FOR CERVICAL CANCER: ICD-10-CM

## 2020-10-22 PROBLEM — O09.523 ELDERLY MULTIGRAVIDA IN THIRD TRIMESTER: Status: RESOLVED | Noted: 2018-11-27 | Resolved: 2020-10-22

## 2020-10-22 PROBLEM — O34.32 INCOMPETENT CERVIX DURING SECOND TRIMESTER, ANTEPARTUM: Status: RESOLVED | Noted: 2018-09-26 | Resolved: 2020-10-22

## 2020-10-22 PROBLEM — O09.93 HIGH-RISK PREGNANCY IN THIRD TRIMESTER: Status: RESOLVED | Noted: 2018-08-08 | Resolved: 2020-10-22

## 2020-10-22 PROBLEM — O99.213 OBESITY AFFECTING PREGNANCY IN THIRD TRIMESTER: Status: RESOLVED | Noted: 2018-10-05 | Resolved: 2020-10-22

## 2020-10-22 PROBLEM — Z98.891 STATUS POST PRIMARY LOW TRANSVERSE CESAREAN SECTION: Status: RESOLVED | Noted: 2019-03-28 | Resolved: 2020-10-22

## 2020-10-22 PROBLEM — O33.4XX0 CEPHALOPELVIC DISPROPORTION DUE TO MIXED MATERNAL AND FETAL FACTORS: Status: RESOLVED | Noted: 2019-03-28 | Resolved: 2020-10-22

## 2020-10-22 PROCEDURE — 76856 US EXAM PELVIC COMPLETE: CPT | Mod: S$GLB,,, | Performed by: OBSTETRICS & GYNECOLOGY

## 2020-10-22 PROCEDURE — 99999 PR PBB SHADOW E&M-EST. PATIENT-LVL III: ICD-10-PCS | Mod: PBBFAC,,, | Performed by: OBSTETRICS & GYNECOLOGY

## 2020-10-22 PROCEDURE — 88175 CYTOPATH C/V AUTO FLUID REDO: CPT

## 2020-10-22 PROCEDURE — 99999 PR PBB SHADOW E&M-EST. PATIENT-LVL III: CPT | Mod: PBBFAC,,, | Performed by: OBSTETRICS & GYNECOLOGY

## 2020-10-22 PROCEDURE — 76856 PR  ECHO,PELVIC (NONOBSTETRIC): ICD-10-PCS | Mod: S$GLB,,, | Performed by: OBSTETRICS & GYNECOLOGY

## 2020-10-22 PROCEDURE — 99395 PREV VISIT EST AGE 18-39: CPT | Mod: S$GLB,,, | Performed by: OBSTETRICS & GYNECOLOGY

## 2020-10-22 PROCEDURE — 87624 HPV HI-RISK TYP POOLED RSLT: CPT

## 2020-10-22 PROCEDURE — 99395 PR PREVENTIVE VISIT,EST,18-39: ICD-10-PCS | Mod: S$GLB,,, | Performed by: OBSTETRICS & GYNECOLOGY

## 2020-10-22 RX ORDER — ALBUTEROL SULFATE 90 UG/1
AEROSOL, METERED RESPIRATORY (INHALATION)
COMMUNITY
Start: 2020-01-13

## 2020-10-22 RX ORDER — NORETHINDRONE ACETATE AND ETHINYL ESTRADIOL 1MG-20(21)
1 KIT ORAL DAILY
Qty: 28 TABLET | Refills: 11 | Status: SHIPPED | OUTPATIENT
Start: 2020-10-22 | End: 2023-02-27 | Stop reason: ALTCHOICE

## 2020-10-22 NOTE — PROGRESS NOTES
Subjective:       Patient ID: Favian Hernandes is a 40 y.o. female.    Chief Complaint:  Abdominal Pain and Well Woman      History of Present Illness  HPI  Annual Exam-Premenopausal  Patient presents for annual exam. The patient c/o pain left side for past 2 wks; also discomfort with walking and movement; (does not hurt when sitting); . The patient is sexually active.--no contraception--interesterest in starting;  GYN screening history: last pap: approximate date  and was normal. The patient wears seatbelts: yes. The patient participates in regular exercise: yes.--walking 2 times/20-30min;  Has the patient ever been transfused or tattooed?: no. The patient reports that there is not domestic violence in her life.    Menses monthly,flow 5 days; tampons-super; change q 5-6 hrs; tolerable dysmenorrhea;             GYN & OB History  Patient's last menstrual period was 10/02/2020 (exact date).   Date of Last Pap: 2017    OB History    Para Term  AB Living   3 2 1 1 1 1   SAB TAB Ectopic Multiple Live Births   1     0 2      # Outcome Date GA Lbr Gus/2nd Weight Sex Delivery Anes PTL Lv   3 Term 19 40w0d  3.31 kg (7 lb 4.8 oz) F CS-LTranv EPI N MAUREEN      Complications: Failure to Progress in First Stage   2 SAB            1  03 25w0d  0.652 kg (1 lb 7 oz) F Vag-Spont   ND       Review of Systems  Review of Systems   Genitourinary: Positive for pelvic pain.   All other systems reviewed and are negative.          Objective:      Physical Exam:   Constitutional: She is oriented to person, place, and time. She appears well-developed.     Eyes: Pupils are equal, round, and reactive to light. Conjunctivae and EOM are normal.    Neck: Normal range of motion. Neck supple.     Pulmonary/Chest: Effort normal. Right breast exhibits no mass, no nipple discharge, no skin change and no tenderness. Left breast exhibits no mass, no nipple discharge, no skin change and no tenderness.  Breasts are symmetrical.        Abdominal: Soft.     Genitourinary:    Vagina, uterus, right adnexa, left adnexa and rectum normal.      Pelvic exam was performed with patient supine.   Cervix is normal. There is a normal right adnexa and a normal left adnexa. Right adnexum displays no mass and no tenderness. Left adnexum displays no mass and no tenderness. No erythema, bleeding, rectocele, cystocele or unspecified prolapse of vaginal walls in the vagina. Labial bartholins normal.Additional cervical findings: pap smear done   Genitourinary Comments: Difficult to assess due to obese abdomen   negative for vaginal discharge       Uterus Size: 6 cm   Musculoskeletal: Normal range of motion.       Neurological: She is alert and oriented to person, place, and time.    Skin: Skin is warm.    Psychiatric: She has a normal mood and affect.           Assessment:     Encounter Diagnoses   Name Primary?    Encounter for gynecological examination (general) (routine) without abnormal findings Yes    Screening for cervical cancer     Encounter for initial prescription of contraceptive pills     Pelvic pain in female     BMI 50.0-59.9, adult                Plan:      Continue annual well woman exam.  Pap today; Reviewed updated recommendations for pap smears (every 3 years) in low risk patients.   Recommend annual pelvic exams.  Reviewed recommendations for annual CBE.  Reviewed contraceptive options--ocp, depo, nuva ring, nexplanon, iud, patch  Reviewed uses of each, risks and benefits.  Pt elects trial of ocp; rx sent for junel fe; reviewed Sunday start, daily use  Accepts referral to weight management clinic  Pelvic sono to eval lt sided pelvic pain   Continue diet, exercise, weight loss

## 2020-10-30 ENCOUNTER — PATIENT MESSAGE (OUTPATIENT)
Dept: OBSTETRICS AND GYNECOLOGY | Facility: CLINIC | Age: 40
End: 2020-10-30

## 2020-11-02 ENCOUNTER — PATIENT MESSAGE (OUTPATIENT)
Dept: OBSTETRICS AND GYNECOLOGY | Facility: CLINIC | Age: 40
End: 2020-11-02

## 2020-11-05 LAB
HPV HR 12 DNA SPEC QL NAA+PROBE: NEGATIVE
HPV16 AG SPEC QL: NEGATIVE
HPV18 DNA SPEC QL NAA+PROBE: NEGATIVE

## 2020-11-27 LAB
FINAL PATHOLOGIC DIAGNOSIS: NORMAL
Lab: NORMAL

## 2021-05-04 NOTE — TRANSFER OF CARE
Anesthesia Transfer of Care Note    Patient: Favian Hernandes    Procedure(s) Performed: Procedure(s) (LRB):   SECTION (N/A)    Patient location: Labor and Delivery    Anesthesia Type: epidural    Transport from OR: Transported from OR on room air with adequate spontaneous ventilation    Post pain: adequate analgesia    Post assessment: no apparent anesthetic complications and tolerated procedure well    Post vital signs: stable    Level of consciousness: awake, oriented and alert    Nausea/Vomiting: no nausea/vomiting    Complications: none    Transfer of care protocol was followed      Last vitals:   Visit Vitals  /88 (BP Location: Right leg, Patient Position: Lying)   Pulse (!) 117   Temp 37.4 °C (99.4 °F) (Oral)   Resp 18   LMP 2018 (Exact Date)   SpO2 100%   Breastfeeding? No     
Self

## 2022-10-14 NOTE — ANESTHESIA RELEASE NOTE
Anesthesia Release from PACU Note    Patient: Favian Hernandes    Procedure(s) Performed: Procedure(s) (LRB):   SECTION (N/A)    Anesthesia type: epidural    Post pain: Adequate analgesia    Post assessment: no apparent anesthetic complications and tolerated procedure well    Last Vitals:   Visit Vitals  /88 (BP Location: Right leg, Patient Position: Lying)   Pulse (!) 117   Temp 37.4 °C (99.4 °F) (Oral)   Resp 18   LMP 2018 (Exact Date)   SpO2 100%   Breastfeeding? No       Post vital signs: stable    Level of consciousness: awake, alert  and oriented    Nausea/Vomiting: no nausea/no vomiting    Complications: none    Airway Patency: patent    Respiratory: unassisted, spontaneous ventilation, room air    Cardiovascular: stable and blood pressure at baseline    Hydration: euvolemic   Otezla Pregnancy And Lactation Text: This medication is Pregnancy Category C and it isn't known if it is safe during pregnancy. It is unknown if it is excreted in breast milk.

## 2023-02-27 ENCOUNTER — OFFICE VISIT (OUTPATIENT)
Dept: OBSTETRICS AND GYNECOLOGY | Facility: CLINIC | Age: 43
End: 2023-02-27
Payer: COMMERCIAL

## 2023-02-27 ENCOUNTER — LAB VISIT (OUTPATIENT)
Dept: LAB | Facility: HOSPITAL | Age: 43
End: 2023-02-27
Attending: OBSTETRICS & GYNECOLOGY
Payer: COMMERCIAL

## 2023-02-27 VITALS
DIASTOLIC BLOOD PRESSURE: 88 MMHG | HEIGHT: 63 IN | WEIGHT: 293 LBS | SYSTOLIC BLOOD PRESSURE: 134 MMHG | BODY MASS INDEX: 51.91 KG/M2

## 2023-02-27 DIAGNOSIS — R53.83 OTHER FATIGUE: ICD-10-CM

## 2023-02-27 DIAGNOSIS — Z01.419 ENCOUNTER FOR GYNECOLOGICAL EXAMINATION (GENERAL) (ROUTINE) WITHOUT ABNORMAL FINDINGS: ICD-10-CM

## 2023-02-27 DIAGNOSIS — Z12.39 ENCOUNTER FOR SCREENING FOR MALIGNANT NEOPLASM OF BREAST, UNSPECIFIED SCREENING MODALITY: Primary | ICD-10-CM

## 2023-02-27 DIAGNOSIS — R63.5 WEIGHT GAIN: ICD-10-CM

## 2023-02-27 DIAGNOSIS — Z12.4 SCREENING FOR CERVICAL CANCER: ICD-10-CM

## 2023-02-27 PROCEDURE — 3075F SYST BP GE 130 - 139MM HG: CPT | Mod: CPTII,S$GLB,, | Performed by: OBSTETRICS & GYNECOLOGY

## 2023-02-27 PROCEDURE — 82306 VITAMIN D 25 HYDROXY: CPT | Performed by: OBSTETRICS & GYNECOLOGY

## 2023-02-27 PROCEDURE — 99999 PR PBB SHADOW E&M-EST. PATIENT-LVL III: ICD-10-PCS | Mod: PBBFAC,,, | Performed by: OBSTETRICS & GYNECOLOGY

## 2023-02-27 PROCEDURE — 87624 HPV HI-RISK TYP POOLED RSLT: CPT | Performed by: OBSTETRICS & GYNECOLOGY

## 2023-02-27 PROCEDURE — 99396 PREV VISIT EST AGE 40-64: CPT | Mod: S$GLB,,, | Performed by: OBSTETRICS & GYNECOLOGY

## 2023-02-27 PROCEDURE — 1159F MED LIST DOCD IN RCRD: CPT | Mod: CPTII,S$GLB,, | Performed by: OBSTETRICS & GYNECOLOGY

## 2023-02-27 PROCEDURE — 36415 COLL VENOUS BLD VENIPUNCTURE: CPT | Mod: PO | Performed by: OBSTETRICS & GYNECOLOGY

## 2023-02-27 PROCEDURE — 85027 COMPLETE CBC AUTOMATED: CPT | Performed by: OBSTETRICS & GYNECOLOGY

## 2023-02-27 PROCEDURE — 3079F PR MOST RECENT DIASTOLIC BLOOD PRESSURE 80-89 MM HG: ICD-10-PCS | Mod: CPTII,S$GLB,, | Performed by: OBSTETRICS & GYNECOLOGY

## 2023-02-27 PROCEDURE — 1159F PR MEDICATION LIST DOCUMENTED IN MEDICAL RECORD: ICD-10-PCS | Mod: CPTII,S$GLB,, | Performed by: OBSTETRICS & GYNECOLOGY

## 2023-02-27 PROCEDURE — 3008F BODY MASS INDEX DOCD: CPT | Mod: CPTII,S$GLB,, | Performed by: OBSTETRICS & GYNECOLOGY

## 2023-02-27 PROCEDURE — 3008F PR BODY MASS INDEX (BMI) DOCUMENTED: ICD-10-PCS | Mod: CPTII,S$GLB,, | Performed by: OBSTETRICS & GYNECOLOGY

## 2023-02-27 PROCEDURE — 84439 ASSAY OF FREE THYROXINE: CPT | Performed by: OBSTETRICS & GYNECOLOGY

## 2023-02-27 PROCEDURE — 3079F DIAST BP 80-89 MM HG: CPT | Mod: CPTII,S$GLB,, | Performed by: OBSTETRICS & GYNECOLOGY

## 2023-02-27 PROCEDURE — 99999 PR PBB SHADOW E&M-EST. PATIENT-LVL III: CPT | Mod: PBBFAC,,, | Performed by: OBSTETRICS & GYNECOLOGY

## 2023-02-27 PROCEDURE — 84443 ASSAY THYROID STIM HORMONE: CPT | Performed by: OBSTETRICS & GYNECOLOGY

## 2023-02-27 PROCEDURE — 88175 CYTOPATH C/V AUTO FLUID REDO: CPT | Performed by: OBSTETRICS & GYNECOLOGY

## 2023-02-27 PROCEDURE — 3075F PR MOST RECENT SYSTOLIC BLOOD PRESS GE 130-139MM HG: ICD-10-PCS | Mod: CPTII,S$GLB,, | Performed by: OBSTETRICS & GYNECOLOGY

## 2023-02-27 PROCEDURE — 82607 VITAMIN B-12: CPT | Performed by: OBSTETRICS & GYNECOLOGY

## 2023-02-27 PROCEDURE — 99396 PR PREVENTIVE VISIT,EST,40-64: ICD-10-PCS | Mod: S$GLB,,, | Performed by: OBSTETRICS & GYNECOLOGY

## 2023-02-27 NOTE — PROGRESS NOTES
Subjective:       Patient ID: Favian Hernandes is a 42 y.o. female.    Chief Complaint:  Annual Exam      History of Present Illness  HPI  Annual Exam-Premenopausal  Patient presents for annual exam. The patient reports increased fatigue and difficulty losing weight; The patient is sexually active. --[facility] screening history: last pap: approximate date 10/2020 and was normal. The patient wears seatbelts: yes. The patient participates in regular exercise: no. --limited by knee pain; Has the patient ever been transfused or tattooed?: no. The patient reports that there is not domestic violence in her life.  Menses monthly , flow 6 day; super tampon for first 3 days; change q 5-6 hrs; tylenol for dysmenorrhea;   GYN & OB History  Patient's last menstrual period was 2023.   Date of Last Pap: 2023    OB History    Para Term  AB Living   3 2 1 1 1 1   SAB IAB Ectopic Multiple Live Births   1     0 2      # Outcome Date GA Lbr Gus/2nd Weight Sex Delivery Anes PTL Lv   3 Term 19 40w0d  3.31 kg (7 lb 4.8 oz) F CS-LTranv EPI N MAUREEN      Complications: Failure to Progress in First Stage   2 SAB            1  03 25w0d  0.652 kg (1 lb 7 oz) F Vag-Spont   ND       Review of Systems  Review of Systems   Constitutional:  Positive for unexpected weight change.   All other systems reviewed and are negative.        Objective:      Physical Exam:   Constitutional: She is oriented to person, place, and time. She appears well-developed and well-nourished.     Eyes: Pupils are equal, round, and reactive to light. Conjunctivae and EOM are normal.      Pulmonary/Chest: Effort normal. Right breast exhibits no mass, no nipple discharge, no skin change and no tenderness. Left breast exhibits no mass, no nipple discharge, no skin change and no tenderness. Breasts are symmetrical.        Abdominal: Soft.     Genitourinary:    Vagina, uterus, right adnexa, left adnexa and rectum normal.       Pelvic exam was performed with patient supine.   The external female genitalia was normal.   Labial bartholins normal.Cervix is normal. Right adnexum displays no mass and no tenderness. Left adnexum displays no mass and no tenderness. No erythema,  no vaginal discharge, bleeding, rectocele, cystocele or unspecified prolapse of vaginal walls in the vagina. Vagina was moist.Uerus contour normal  Normal urethral meatus.Urethra findings: no urethral massBladder findings: no bladder distention and no bladder tenderness          Musculoskeletal: Normal range of motion and moves all extremeties.       Neurological: She is alert and oriented to person, place, and time.    Skin: Skin is warm.   Acanthosis nigrans    Psychiatric: She has a normal mood and affect. Her behavior is normal.         Assessment:        1. Encounter for screening for malignant neoplasm of breast, unspecified screening modality    2. Encounter for gynecological examination (general) (routine) without abnormal findings    3. Other fatigue    4. Weight gain    5. Screening for cervical cancer               Plan:      Continue annual well woman exam.  Pap today; Reviewed updated recommendations for pap smears (every 3 years) in low risk patients.   Recommend annual pelvic exams.  Reviewed recommendations for annual CBE.  Continue menstrual calendar  Safe sex  Pamphlet given on mirena  Reassurance given regarding mood lability since off ocp  mammo ordered, continue yearly until age 77   Cbc, thyroid panel, vit d , vit b12 ordered  Continue diet, exercise, weight loss   Screening colonoscopy due age 47

## 2023-02-28 LAB
25(OH)D3+25(OH)D2 SERPL-MCNC: 31 NG/ML (ref 30–96)
ERYTHROCYTE [DISTWIDTH] IN BLOOD BY AUTOMATED COUNT: 14.1 % (ref 11.5–14.5)
HCT VFR BLD AUTO: 35.5 % (ref 37–48.5)
HGB BLD-MCNC: 11 G/DL (ref 12–16)
MCH RBC QN AUTO: 27.3 PG (ref 27–31)
MCHC RBC AUTO-ENTMCNC: 31 G/DL (ref 32–36)
MCV RBC AUTO: 88 FL (ref 82–98)
PLATELET # BLD AUTO: 547 K/UL (ref 150–450)
PMV BLD AUTO: 9.3 FL (ref 9.2–12.9)
RBC # BLD AUTO: 4.03 M/UL (ref 4–5.4)
T4 FREE SERPL-MCNC: 0.94 NG/DL (ref 0.71–1.51)
TSH SERPL DL<=0.005 MIU/L-ACNC: 1.81 UIU/ML (ref 0.4–4)
VIT B12 SERPL-MCNC: 544 PG/ML (ref 210–950)
WBC # BLD AUTO: 4.83 K/UL (ref 3.9–12.7)

## 2023-02-28 NOTE — PROGRESS NOTES
Your labs are available for review  Your thyroid is normally functioning  Your hemoglobin shows minimal anemia  Your vitamin d is low, please add 5000 units of vit d 3  Your b12 could be higher--b12 injection, more raw fruits/veggies

## 2023-03-02 LAB
FINAL PATHOLOGIC DIAGNOSIS: NORMAL
HPV HR 12 DNA SPEC QL NAA+PROBE: NEGATIVE
HPV16 AG SPEC QL: NEGATIVE
HPV18 DNA SPEC QL NAA+PROBE: NEGATIVE
Lab: NORMAL

## 2023-03-02 NOTE — PROGRESS NOTES
Good News! Your pap smear came back and it was normal.  The HPV is also negative.   I still recommend doing a pelvic exam and annual visit every year, but you only need the pap test every 3 years. Please call me if you have any further questions.   Sincerely,   Dr. Sparks

## 2024-02-23 ENCOUNTER — HOSPITAL ENCOUNTER (OUTPATIENT)
Dept: RADIOLOGY | Facility: HOSPITAL | Age: 44
Discharge: HOME | End: 2024-02-23
Attending: OBSTETRICS & GYNECOLOGY
Payer: COMMERCIAL

## 2024-02-23 VITALS — HEIGHT: 63 IN | BODY MASS INDEX: 51.91 KG/M2 | WEIGHT: 293 LBS

## 2024-02-23 DIAGNOSIS — Z12.39 ENCOUNTER FOR SCREENING FOR MALIGNANT NEOPLASM OF BREAST, UNSPECIFIED SCREENING MODALITY: ICD-10-CM

## 2024-02-23 PROCEDURE — 77067 SCR MAMMO BI INCL CAD: CPT | Mod: TC

## 2024-02-23 PROCEDURE — 77067 SCR MAMMO BI INCL CAD: CPT | Mod: 26,,, | Performed by: RADIOLOGY

## 2024-02-23 PROCEDURE — 77063 BREAST TOMOSYNTHESIS BI: CPT | Mod: 26,,, | Performed by: RADIOLOGY

## 2024-02-26 NOTE — PROGRESS NOTES
I am happy to report that your recent breast imaging did NOT show evidence of cancer. An annual mammogram is the best test to screen for breast cancer, but it is not perfect, and it can miss some cancers. So, even though your mammogram was normal, if you notice any lump or change in one of your breasts, please schedule an appointment with me for a proper evaluation. Thank you for letting me care for you. I look forward to seeing you again. Sincerely, Dr. Benita Sparks

## 2025-06-02 ENCOUNTER — OFFICE VISIT (OUTPATIENT)
Dept: OBSTETRICS AND GYNECOLOGY | Facility: CLINIC | Age: 45
End: 2025-06-02
Payer: COMMERCIAL

## 2025-06-02 VITALS
WEIGHT: 293 LBS | BODY MASS INDEX: 51.91 KG/M2 | SYSTOLIC BLOOD PRESSURE: 104 MMHG | DIASTOLIC BLOOD PRESSURE: 80 MMHG | HEIGHT: 63 IN

## 2025-06-02 DIAGNOSIS — Z01.419 ENCOUNTER FOR GYNECOLOGICAL EXAMINATION (GENERAL) (ROUTINE) WITHOUT ABNORMAL FINDINGS: Primary | ICD-10-CM

## 2025-06-02 DIAGNOSIS — Z12.4 SCREENING FOR CERVICAL CANCER: ICD-10-CM

## 2025-06-02 DIAGNOSIS — Z12.31 SCREENING MAMMOGRAM, ENCOUNTER FOR: ICD-10-CM

## 2025-06-02 DIAGNOSIS — Z30.011 ENCOUNTER FOR INITIAL PRESCRIPTION OF CONTRACEPTIVE PILLS: ICD-10-CM

## 2025-06-02 PROCEDURE — 3008F BODY MASS INDEX DOCD: CPT | Mod: CPTII,S$GLB,, | Performed by: OBSTETRICS & GYNECOLOGY

## 2025-06-02 PROCEDURE — 99396 PREV VISIT EST AGE 40-64: CPT | Mod: S$GLB,,, | Performed by: OBSTETRICS & GYNECOLOGY

## 2025-06-02 PROCEDURE — 3074F SYST BP LT 130 MM HG: CPT | Mod: CPTII,S$GLB,, | Performed by: OBSTETRICS & GYNECOLOGY

## 2025-06-02 PROCEDURE — 3079F DIAST BP 80-89 MM HG: CPT | Mod: CPTII,S$GLB,, | Performed by: OBSTETRICS & GYNECOLOGY

## 2025-06-02 PROCEDURE — 99999 PR PBB SHADOW E&M-EST. PATIENT-LVL III: CPT | Mod: PBBFAC,,, | Performed by: OBSTETRICS & GYNECOLOGY

## 2025-06-02 PROCEDURE — 1159F MED LIST DOCD IN RCRD: CPT | Mod: CPTII,S$GLB,, | Performed by: OBSTETRICS & GYNECOLOGY

## 2025-06-02 RX ORDER — METOPROLOL SUCCINATE 25 MG/1
25 TABLET, EXTENDED RELEASE ORAL
COMMUNITY

## 2025-06-02 RX ORDER — NORETHINDRONE ACETATE AND ETHINYL ESTRADIOL .02; 1 MG/1; MG/1
1 TABLET ORAL DAILY
Qty: 28 TABLET | Refills: 0 | Status: SHIPPED | OUTPATIENT
Start: 2025-06-02 | End: 2025-07-02

## 2025-06-02 RX ORDER — HYDROCHLOROTHIAZIDE 25 MG/1
25 TABLET ORAL
COMMUNITY

## 2025-06-10 ENCOUNTER — RESULTS FOLLOW-UP (OUTPATIENT)
Dept: OBSTETRICS AND GYNECOLOGY | Facility: HOSPITAL | Age: 45
End: 2025-06-10

## 2025-06-10 ENCOUNTER — CLINICAL SUPPORT (OUTPATIENT)
Dept: OBSTETRICS AND GYNECOLOGY | Facility: CLINIC | Age: 45
End: 2025-06-10
Payer: COMMERCIAL

## 2025-06-10 ENCOUNTER — PATIENT MESSAGE (OUTPATIENT)
Dept: OBSTETRICS AND GYNECOLOGY | Facility: CLINIC | Age: 45
End: 2025-06-10
Payer: COMMERCIAL

## 2025-06-10 ENCOUNTER — HOSPITAL ENCOUNTER (OUTPATIENT)
Dept: RADIOLOGY | Facility: HOSPITAL | Age: 45
Discharge: HOME | End: 2025-06-10
Attending: OBSTETRICS & GYNECOLOGY
Payer: COMMERCIAL

## 2025-06-10 VITALS
DIASTOLIC BLOOD PRESSURE: 80 MMHG | HEIGHT: 63 IN | HEIGHT: 63 IN | WEIGHT: 293 LBS | BODY MASS INDEX: 51.91 KG/M2 | BODY MASS INDEX: 51.91 KG/M2 | WEIGHT: 293 LBS | SYSTOLIC BLOOD PRESSURE: 102 MMHG

## 2025-06-10 DIAGNOSIS — Z01.30 BLOOD PRESSURE CHECK ON ORAL CONTRACEPTIVES: Primary | ICD-10-CM

## 2025-06-10 DIAGNOSIS — Z79.3 BLOOD PRESSURE CHECK ON ORAL CONTRACEPTIVES: Primary | ICD-10-CM

## 2025-06-10 DIAGNOSIS — Z12.31 SCREENING MAMMOGRAM, ENCOUNTER FOR: ICD-10-CM

## 2025-06-10 DIAGNOSIS — Z30.011 ENCOUNTER FOR INITIAL PRESCRIPTION OF CONTRACEPTIVE PILLS: ICD-10-CM

## 2025-06-10 PROCEDURE — 77067 SCR MAMMO BI INCL CAD: CPT | Mod: TC

## 2025-06-10 PROCEDURE — 99999 PR PBB SHADOW E&M-EST. PATIENT-LVL III: CPT | Mod: PBBFAC,,,

## 2025-06-10 PROCEDURE — 77067 SCR MAMMO BI INCL CAD: CPT | Mod: 26,,, | Performed by: RADIOLOGY

## 2025-06-10 PROCEDURE — 77063 BREAST TOMOSYNTHESIS BI: CPT | Mod: 26,,, | Performed by: RADIOLOGY

## 2025-06-10 RX ORDER — NORETHINDRONE ACETATE AND ETHINYL ESTRADIOL .02; 1 MG/1; MG/1
1 TABLET ORAL DAILY
Qty: 90 TABLET | Refills: 3 | Status: SHIPPED | OUTPATIENT
Start: 2025-06-10 | End: 2026-06-10

## 2025-06-10 NOTE — PROGRESS NOTES
Favian Dia Cecilio 44 y.o. female is here today for Blood Pressure check.   History of HTN yes.    Review of patient's allergies indicates:  No Known Allergies  Creatinine   Date Value Ref Range Status   04/04/2019 0.7 0.5 - 1.4 mg/dL Final     Sodium   Date Value Ref Range Status   04/04/2019 140 136 - 145 mmol/L Final     Potassium   Date Value Ref Range Status   04/04/2019 4.0 3.5 - 5.1 mmol/L Final   ]  Patient verifies taking blood pressure medications on a regular basis at the same time of the day.   Current Medications[1]  Does patient have record of home blood pressure readings no. Per patient readings have been averaging normal.  Last dose of blood pressure medication was taken at 07:30am today.  Patient is asymptomatic.   Complains of nothing today.    BP: 102/80   .      Dr. Ford notified via Epic message.         [1]   Current Outpatient Medications:     hydroCHLOROthiazide (HYDRODIURIL) 25 MG tablet, Take 25 mg by mouth., Disp: , Rfl:     metoprolol succinate (TOPROL-XL) 25 MG 24 hr tablet, Take 25 mg by mouth., Disp: , Rfl:     multivitamin capsule, Take 1 capsule by mouth once daily., Disp: , Rfl:     norethindrone-ethinyl estradiol (LOESTRIN 1/20, 21,) 1-20 mg-mcg per tablet, Take 1 tablet by mouth once daily., Disp: 28 tablet, Rfl: 0    semaglutide (OZEMPIC) 0.25 mg or 0.5 mg (2 mg/3 mL) pen injector, Inject 0.5 mg into the skin every 7 days., Disp: , Rfl:

## 2025-06-19 ENCOUNTER — TELEPHONE (OUTPATIENT)
Dept: OBSTETRICS AND GYNECOLOGY | Facility: CLINIC | Age: 45
End: 2025-06-19
Payer: COMMERCIAL

## (undated) DEVICE — TAPE SILK 3IN

## (undated) DEVICE — DRESSING COVER AQUACEL AG SURG

## (undated) DEVICE — CATH 16FR URETHRL RED RUB

## (undated) DEVICE — SEE MEDLINE ITEM 157181

## (undated) DEVICE — SEE MEDLINE ITEM 157027

## (undated) DEVICE — CONTAINER SPECIMEN STRL 4OZ

## (undated) DEVICE — MANIFOLD 4 PORT

## (undated) DEVICE — PAD ABD 8X10 STERILE

## (undated) DEVICE — COVER OVERHEAD SURG LT BLUE

## (undated) DEVICE — SEE MEDLINE ITEM 152739

## (undated) DEVICE — GLOVE SURG STRL SZ 6.5

## (undated) DEVICE — GLOVE PROTEXIS HYDROGEL SZ7.5

## (undated) DEVICE — SEE MEDLINE ITEM 152622

## (undated) DEVICE — SEE L#152161

## (undated) DEVICE — SOL NS 1000CC

## (undated) DEVICE — SUT MERSILENE 5-0 RS22

## (undated) DEVICE — SYR 10CC LUER LOCK

## (undated) DEVICE — PACK DRAPE PERI/GYN TIBURON

## (undated) DEVICE — GLOVE 7.0 PROTEXIS PI BLUE

## (undated) DEVICE — TAPE MEDIPORE SOFT CLOTH 2X2